# Patient Record
Sex: MALE | Race: WHITE | NOT HISPANIC OR LATINO | Employment: OTHER | URBAN - METROPOLITAN AREA
[De-identification: names, ages, dates, MRNs, and addresses within clinical notes are randomized per-mention and may not be internally consistent; named-entity substitution may affect disease eponyms.]

---

## 2017-09-07 ENCOUNTER — HISTORICAL (OUTPATIENT)
Dept: LAB | Facility: HOSPITAL | Age: 67
End: 2017-09-07

## 2017-09-07 LAB
EST. AVERAGE GLUCOSE BLD GHB EST-MCNC: 126 MG/DL
HBA1C MFR BLD: 6 % (ref 4.5–6.2)
PSA SERPL-MCNC: 0.59 NG/ML (ref 0–4)

## 2018-09-11 ENCOUNTER — HISTORICAL (OUTPATIENT)
Dept: LAB | Facility: HOSPITAL | Age: 68
End: 2018-09-11

## 2018-09-11 LAB
EST. AVERAGE GLUCOSE BLD GHB EST-MCNC: 123 MG/DL
HBA1C MFR BLD: 5.9 % (ref 4.5–6.2)
PSA SERPL-MCNC: 1.44 NG/ML (ref 0–4)

## 2019-05-21 ENCOUNTER — HISTORICAL (OUTPATIENT)
Dept: LAB | Facility: HOSPITAL | Age: 69
End: 2019-05-21

## 2019-05-21 LAB
ALBUMIN SERPL-MCNC: 3.6 GM/DL (ref 3.4–5)
ALBUMIN/GLOB SERPL: 1 RATIO (ref 1.1–2)
ALP SERPL-CCNC: 58 UNIT/L (ref 46–116)
ALT SERPL-CCNC: 31 UNIT/L (ref 12–78)
AST SERPL-CCNC: 14 UNIT/L (ref 10–37)
BILIRUB SERPL-MCNC: 0.3 MG/DL (ref 0.2–1)
BILIRUBIN DIRECT+TOT PNL SERPL-MCNC: 0.13 MG/DL (ref 0–0.2)
BILIRUBIN DIRECT+TOT PNL SERPL-MCNC: 0.17 MG/DL
BUN SERPL-MCNC: 13 MG/DL (ref 7–18)
CALCIUM SERPL-MCNC: 9.1 MG/DL (ref 8.5–10.1)
CHLORIDE SERPL-SCNC: 102 MMOL/L (ref 98–107)
CHOLEST SERPL-MCNC: 131 MG/DL (ref 50–200)
CHOLEST/HDLC SERPL: 2 {RATIO} (ref 0–5)
CO2 SERPL-SCNC: 25.7 MMOL/L (ref 21–32)
CREAT SERPL-MCNC: 0.87 MG/DL (ref 0.7–1.3)
EST. AVERAGE GLUCOSE BLD GHB EST-MCNC: 134 MG/DL
GLOBULIN SER-MCNC: 3.6 GM/DL (ref 2.4–3.5)
GLUCOSE SERPL-MCNC: 124 MG/DL (ref 74–106)
HBA1C MFR BLD: 6.3 % (ref 4.5–6.2)
HDLC SERPL-MCNC: 62 MG/DL (ref 35–60)
LDLC SERPL CALC-MCNC: 53 MG/DL (ref 50–140)
POTASSIUM SERPL-SCNC: 4.5 MMOL/L (ref 3.5–5.1)
PROT SERPL-MCNC: 7.2 GM/DL (ref 6.4–8.2)
PSA SERPL-MCNC: 0.93 NG/ML (ref 0–4)
SODIUM SERPL-SCNC: 137 MMOL/L (ref 136–145)
TRIGL SERPL-MCNC: 80 MG/DL (ref 30–150)
VLDLC SERPL CALC-MCNC: 16 MG/DL

## 2019-10-08 ENCOUNTER — HISTORICAL (OUTPATIENT)
Dept: LAB | Facility: HOSPITAL | Age: 69
End: 2019-10-08

## 2019-10-08 LAB
BUN SERPL-MCNC: 12 MG/DL (ref 7–18)
CALCIUM SERPL-MCNC: 9.3 MG/DL (ref 8.5–10.1)
CHLORIDE SERPL-SCNC: 101 MMOL/L (ref 98–107)
CO2 SERPL-SCNC: 27.6 MMOL/L (ref 21–32)
CREAT SERPL-MCNC: 0.99 MG/DL (ref 0.7–1.3)
CREAT/UREA NIT SERPL: 12
EST. AVERAGE GLUCOSE BLD GHB EST-MCNC: 134 MG/DL
GLUCOSE SERPL-MCNC: 142 MG/DL (ref 74–106)
HBA1C MFR BLD: 6.3 % (ref 4.5–6.2)
POTASSIUM SERPL-SCNC: 4.9 MMOL/L (ref 3.5–5.1)
SODIUM SERPL-SCNC: 136 MMOL/L (ref 136–145)

## 2020-01-17 ENCOUNTER — HISTORICAL (OUTPATIENT)
Dept: CARDIOLOGY | Facility: HOSPITAL | Age: 70
End: 2020-01-17

## 2020-07-06 ENCOUNTER — HISTORICAL (OUTPATIENT)
Dept: LAB | Facility: HOSPITAL | Age: 70
End: 2020-07-06

## 2020-07-06 LAB
ABS NEUT (OLG): 2.8 X10(3)/MCL (ref 1.5–6.9)
BASOPHILS # BLD AUTO: 0 X10(3)/MCL (ref 0–0.1)
BASOPHILS NFR BLD AUTO: 1 % (ref 0–1)
BUN SERPL-MCNC: 14 MG/DL (ref 8.4–25.7)
CALCIUM SERPL-MCNC: 9.2 MG/DL (ref 8.8–10)
CHLORIDE SERPL-SCNC: 106 MMOL/L (ref 98–107)
CO2 SERPL-SCNC: 22 MMOL/L (ref 23–31)
CREAT SERPL-MCNC: 0.78 MG/DL (ref 0.73–1.18)
CREAT/UREA NIT SERPL: 18
EOSINOPHIL # BLD AUTO: 0.7 X10(3)/MCL (ref 0–0.6)
EOSINOPHIL NFR BLD AUTO: 10 % (ref 0–5)
ERYTHROCYTE [DISTWIDTH] IN BLOOD BY AUTOMATED COUNT: 13.1 % (ref 11.5–17)
GLUCOSE SERPL-MCNC: 116 MG/DL (ref 82–115)
HCT VFR BLD AUTO: 43.6 % (ref 42–52)
HGB BLD-MCNC: 15.3 GM/DL (ref 14–18)
IMM GRANULOCYTES # BLD AUTO: 0.01 10*3/UL (ref 0–0.02)
IMM GRANULOCYTES NFR BLD AUTO: 0.1 % (ref 0–0.43)
LYMPHOCYTES # BLD AUTO: 2.6 X10(3)/MCL (ref 0.5–4.1)
LYMPHOCYTES NFR BLD AUTO: 37 % (ref 15–40)
MCH RBC QN AUTO: 34 PG (ref 27–34)
MCHC RBC AUTO-ENTMCNC: 35 GM/DL (ref 31–36)
MCV RBC AUTO: 98 FL (ref 80–99)
MONOCYTES # BLD AUTO: 0.9 X10(3)/MCL (ref 0–1.1)
MONOCYTES NFR BLD AUTO: 13 % (ref 4–12)
NEUTROPHILS # BLD AUTO: 2.8 X10(3)/MCL (ref 1.5–6.9)
NEUTROPHILS NFR BLD AUTO: 40 % (ref 43–75)
PLATELET # BLD AUTO: 259 X10(3)/MCL (ref 140–400)
PMV BLD AUTO: 9.4 FL (ref 6.8–10)
POTASSIUM SERPL-SCNC: 4.1 MMOL/L (ref 3.5–5.1)
RBC # BLD AUTO: 4.46 X10(6)/MCL (ref 4.7–6.1)
SODIUM SERPL-SCNC: 137 MMOL/L (ref 136–145)
WBC # SPEC AUTO: 7 X10(3)/MCL (ref 4.5–11.5)

## 2020-08-13 LAB — CRC RECOMMENDATION EXT: NORMAL

## 2021-01-25 ENCOUNTER — HISTORICAL (OUTPATIENT)
Dept: LAB | Facility: HOSPITAL | Age: 71
End: 2021-01-25

## 2021-01-25 LAB
ALBUMIN SERPL-MCNC: 4 GM/DL (ref 3.4–4.8)
ALBUMIN/GLOB SERPL: 1.2 RATIO (ref 1.1–2)
ALP SERPL-CCNC: 56 UNIT/L (ref 40–150)
ALT SERPL-CCNC: 22 UNIT/L (ref 0–55)
AST SERPL-CCNC: 19 UNIT/L (ref 5–34)
BILIRUB SERPL-MCNC: 0.4 MG/DL
BILIRUBIN DIRECT+TOT PNL SERPL-MCNC: 0.2 MG/DL
BILIRUBIN DIRECT+TOT PNL SERPL-MCNC: 0.2 MG/DL (ref 0–0.5)
BUN SERPL-MCNC: 15 MG/DL (ref 8.4–25.7)
CALCIUM SERPL-MCNC: 9.3 MG/DL (ref 8.8–10)
CHLORIDE SERPL-SCNC: 102 MMOL/L (ref 98–107)
CHOLEST SERPL-MCNC: 156 MG/DL
CHOLEST/HDLC SERPL: 3 {RATIO} (ref 0–5)
CO2 SERPL-SCNC: 24 MEQ/L (ref 23–31)
CREAT SERPL-MCNC: 0.76 MG/DL (ref 0.73–1.18)
EST. AVERAGE GLUCOSE BLD GHB EST-MCNC: 128 MG/DL
GLOBULIN SER-MCNC: 3.4 GM/DL (ref 2.4–3.5)
GLUCOSE SERPL-MCNC: 131 MG/DL (ref 82–115)
HBA1C MFR BLD: 6.1 % (ref 4–6)
HDLC SERPL-MCNC: 56 MG/DL (ref 35–60)
LDLC SERPL CALC-MCNC: 80 MG/DL (ref 50–140)
POTASSIUM SERPL-SCNC: 4.5 MMOL/L (ref 3.5–5.1)
PROT SERPL-MCNC: 7.4 GM/DL (ref 5.8–7.6)
PSA SERPL-MCNC: 0.54 NG/ML
SODIUM SERPL-SCNC: 137 MMOL/L (ref 136–145)
TRIGL SERPL-MCNC: 100 MG/DL (ref 34–140)
VLDLC SERPL CALC-MCNC: 20 MG/DL

## 2021-06-02 ENCOUNTER — HISTORICAL (OUTPATIENT)
Dept: RADIOLOGY | Facility: HOSPITAL | Age: 71
End: 2021-06-02

## 2022-04-10 ENCOUNTER — HISTORICAL (OUTPATIENT)
Dept: ADMINISTRATIVE | Facility: HOSPITAL | Age: 72
End: 2022-04-10

## 2022-04-28 VITALS
BODY MASS INDEX: 32.25 KG/M2 | OXYGEN SATURATION: 96 % | HEIGHT: 63 IN | WEIGHT: 182 LBS | DIASTOLIC BLOOD PRESSURE: 86 MMHG | SYSTOLIC BLOOD PRESSURE: 132 MMHG

## 2022-12-01 DIAGNOSIS — J44.9 CHRONIC OBSTRUCTIVE PULMONARY DISEASE, UNSPECIFIED: ICD-10-CM

## 2022-12-01 RX ORDER — ALBUTEROL SULFATE 90 UG/1
AEROSOL, METERED RESPIRATORY (INHALATION)
Qty: 18 G | Refills: 3 | Status: SHIPPED | OUTPATIENT
Start: 2022-12-01 | End: 2022-12-28

## 2023-01-05 ENCOUNTER — OFFICE VISIT (OUTPATIENT)
Dept: FAMILY MEDICINE | Facility: CLINIC | Age: 73
End: 2023-01-05
Payer: MEDICARE

## 2023-01-05 VITALS
BODY MASS INDEX: 28.99 KG/M2 | HEART RATE: 66 BPM | HEIGHT: 66 IN | SYSTOLIC BLOOD PRESSURE: 138 MMHG | RESPIRATION RATE: 18 BRPM | TEMPERATURE: 98 F | DIASTOLIC BLOOD PRESSURE: 87 MMHG | WEIGHT: 180.38 LBS | OXYGEN SATURATION: 96 %

## 2023-01-05 DIAGNOSIS — Z72.0 TOBACCO ABUSE: ICD-10-CM

## 2023-01-05 DIAGNOSIS — L98.9 SKIN LESION: ICD-10-CM

## 2023-01-05 DIAGNOSIS — R73.03 PRE-DIABETES: ICD-10-CM

## 2023-01-05 DIAGNOSIS — I25.10 CORONARY ARTERY DISEASE, UNSPECIFIED VESSEL OR LESION TYPE, UNSPECIFIED WHETHER ANGINA PRESENT, UNSPECIFIED WHETHER NATIVE OR TRANSPLANTED HEART: ICD-10-CM

## 2023-01-05 DIAGNOSIS — Z12.5 ENCOUNTER FOR PROSTATE CANCER SCREENING: ICD-10-CM

## 2023-01-05 DIAGNOSIS — Z00.00 WELLNESS EXAMINATION: ICD-10-CM

## 2023-01-05 DIAGNOSIS — J44.9 CHRONIC OBSTRUCTIVE PULMONARY DISEASE, UNSPECIFIED COPD TYPE: ICD-10-CM

## 2023-01-05 DIAGNOSIS — E78.5 HYPERLIPIDEMIA, UNSPECIFIED HYPERLIPIDEMIA TYPE: ICD-10-CM

## 2023-01-05 DIAGNOSIS — I10 HYPERTENSION, UNSPECIFIED TYPE: ICD-10-CM

## 2023-01-05 DIAGNOSIS — Z79.51 LONG TERM (CURRENT) USE OF INHALED STEROIDS: ICD-10-CM

## 2023-01-05 DIAGNOSIS — Z76.89 ESTABLISHING CARE WITH NEW DOCTOR, ENCOUNTER FOR: Primary | ICD-10-CM

## 2023-01-05 PROCEDURE — 3075F PR MOST RECENT SYSTOLIC BLOOD PRESS GE 130-139MM HG: ICD-10-PCS | Mod: CPTII,,, | Performed by: REGISTERED NURSE

## 2023-01-05 PROCEDURE — 1159F PR MEDICATION LIST DOCUMENTED IN MEDICAL RECORD: ICD-10-PCS | Mod: CPTII,,, | Performed by: REGISTERED NURSE

## 2023-01-05 PROCEDURE — 99204 OFFICE O/P NEW MOD 45 MIN: CPT | Mod: ,,, | Performed by: REGISTERED NURSE

## 2023-01-05 PROCEDURE — 1126F PR PAIN SEVERITY QUANTIFIED, NO PAIN PRESENT: ICD-10-PCS | Mod: CPTII,,, | Performed by: REGISTERED NURSE

## 2023-01-05 PROCEDURE — 3079F PR MOST RECENT DIASTOLIC BLOOD PRESSURE 80-89 MM HG: ICD-10-PCS | Mod: CPTII,,, | Performed by: REGISTERED NURSE

## 2023-01-05 PROCEDURE — 3008F PR BODY MASS INDEX (BMI) DOCUMENTED: ICD-10-PCS | Mod: CPTII,,, | Performed by: REGISTERED NURSE

## 2023-01-05 PROCEDURE — 1126F AMNT PAIN NOTED NONE PRSNT: CPT | Mod: CPTII,,, | Performed by: REGISTERED NURSE

## 2023-01-05 PROCEDURE — 3075F SYST BP GE 130 - 139MM HG: CPT | Mod: CPTII,,, | Performed by: REGISTERED NURSE

## 2023-01-05 PROCEDURE — 3079F DIAST BP 80-89 MM HG: CPT | Mod: CPTII,,, | Performed by: REGISTERED NURSE

## 2023-01-05 PROCEDURE — 1159F MED LIST DOCD IN RCRD: CPT | Mod: CPTII,,, | Performed by: REGISTERED NURSE

## 2023-01-05 PROCEDURE — 99204 PR OFFICE/OUTPT VISIT, NEW, LEVL IV, 45-59 MIN: ICD-10-PCS | Mod: ,,, | Performed by: REGISTERED NURSE

## 2023-01-05 PROCEDURE — 3008F BODY MASS INDEX DOCD: CPT | Mod: CPTII,,, | Performed by: REGISTERED NURSE

## 2023-01-05 NOTE — PROGRESS NOTES
Subjective:       Patient ID: Siobhan Scott Jr. is a 72 y.o. male.    Chief Complaint: [facility]    Patient is a 72-year-old male here today to [facility] patient has past medical history of hypertension, hyperlipidemia, COPD, CAD with stents, and tobacco abuse.  No complaints at this time.  Review of Systems   Constitutional: Negative.    Respiratory:  Positive for cough and wheezing.    All other systems reviewed and are negative.      Objective:      Physical Exam  Vitals reviewed.   Constitutional:       Appearance: Normal appearance.   HENT:      Head: Normocephalic.      Right Ear: Hearing and tympanic membrane normal.      Left Ear: Hearing and tympanic membrane normal.      Nose: Nose normal.      Mouth/Throat:      Mouth: Mucous membranes are moist.   Eyes:      Pupils: Pupils are equal, round, and reactive to light.   Cardiovascular:      Rate and Rhythm: Normal rate and regular rhythm.      Pulses: Normal pulses.      Heart sounds: Normal heart sounds.   Pulmonary:      Effort: Pulmonary effort is normal.      Breath sounds: Examination of the right-lower field reveals wheezing. Examination of the left-lower field reveals wheezing. Wheezing present.   Abdominal:      General: Abdomen is flat.      Palpations: Abdomen is soft.   Musculoskeletal:         General: Normal range of motion.      Cervical back: Normal range of motion and neck supple.   Skin:     General: Skin is warm.      Capillary Refill: Capillary refill takes less than 2 seconds.      Findings: Lesion present.      Comments: Round plaque like, hyperpigmented lesion noted to R ear   Neurological:      General: No focal deficit present.      Mental Status: He is alert and oriented to person, place, and time.   Psychiatric:         Mood and Affect: Mood normal.         Behavior: Behavior normal.         Thought Content: Thought content normal.         Judgment: Judgment normal.       Assessment:       Problem List Items Addressed This  Visit          Pulmonary    Chronic obstructive pulmonary disease       Cardiac/Vascular    Hypertension    Hyperlipidemia    Coronary artery disease       Other    Tobacco abuse     Other Visit Diagnoses       Establishing care with new doctor, encounter for    -  Primary    Wellness examination        Encounter for prostate cancer screening        Skin lesion            I spent a total of 45 minutes on the day of the visit.This includes face to face time and non-face to face time preparing to see the patient (eg, review of tests), obtaining and/or reviewing separately obtained history, documenting clinical information in the electronic or other health record, independently interpreting results and communicating results to the patient/family/caregiver, or care coordinator.    Plan:   [id number] sat on room air was 96%. Patient encouraged to stop smoking, take inhalers as prescribed, keep next scheduled appointment with pulmonologist.  Report to ED if you become short of breath.    Tobacco abuse-smoking cessation discussed with patient, patient aware of the detrimental effects to health, declines smoking cessation measures at this time.    Hypertension-low-sodium diet discussed, blood pressure currently controlled on medication regimen, continue    Hyperlipidemia-CAD-keep next scheduled appointment with cardiologist on January 20th, stop smoking, low-cholesterol/low-fat diet discussed.  Continue current medication regimen.    Skin lesion-patient encouraged to use sunscreen daily, dermatology referral sent today.    Return to clinic in 3 months for wellness, labs to be completed prior to visit.

## 2023-03-21 ENCOUNTER — LAB VISIT (OUTPATIENT)
Dept: LAB | Facility: HOSPITAL | Age: 73
End: 2023-03-21
Attending: REGISTERED NURSE
Payer: MEDICARE

## 2023-03-21 ENCOUNTER — OFFICE VISIT (OUTPATIENT)
Dept: FAMILY MEDICINE | Facility: CLINIC | Age: 73
End: 2023-03-21
Payer: MEDICARE

## 2023-03-21 VITALS
TEMPERATURE: 98 F | HEART RATE: 76 BPM | HEIGHT: 66 IN | DIASTOLIC BLOOD PRESSURE: 70 MMHG | RESPIRATION RATE: 20 BRPM | SYSTOLIC BLOOD PRESSURE: 112 MMHG | OXYGEN SATURATION: 98 % | WEIGHT: 181.19 LBS | BODY MASS INDEX: 29.12 KG/M2

## 2023-03-21 DIAGNOSIS — E78.5 HYPERLIPIDEMIA, UNSPECIFIED HYPERLIPIDEMIA TYPE: ICD-10-CM

## 2023-03-21 DIAGNOSIS — Z00.00 WELLNESS EXAMINATION: ICD-10-CM

## 2023-03-21 DIAGNOSIS — R73.03 PRE-DIABETES: ICD-10-CM

## 2023-03-21 DIAGNOSIS — Z79.51 LONG TERM (CURRENT) USE OF INHALED STEROIDS: ICD-10-CM

## 2023-03-21 DIAGNOSIS — Z12.5 ENCOUNTER FOR PROSTATE CANCER SCREENING: ICD-10-CM

## 2023-03-21 DIAGNOSIS — I10 HYPERTENSION, UNSPECIFIED TYPE: ICD-10-CM

## 2023-03-21 DIAGNOSIS — R73.03 PREDIABETES: ICD-10-CM

## 2023-03-21 DIAGNOSIS — I25.10 CORONARY ARTERY DISEASE, UNSPECIFIED VESSEL OR LESION TYPE, UNSPECIFIED WHETHER ANGINA PRESENT, UNSPECIFIED WHETHER NATIVE OR TRANSPLANTED HEART: ICD-10-CM

## 2023-03-21 DIAGNOSIS — Z79.899 OTHER LONG TERM (CURRENT) DRUG THERAPY: ICD-10-CM

## 2023-03-21 DIAGNOSIS — J44.9 CHRONIC OBSTRUCTIVE PULMONARY DISEASE, UNSPECIFIED COPD TYPE: ICD-10-CM

## 2023-03-21 DIAGNOSIS — Z00.00 WELLNESS EXAMINATION: Primary | ICD-10-CM

## 2023-03-21 LAB
ALBUMIN SERPL-MCNC: 3.8 G/DL (ref 3.4–4.8)
ALBUMIN/GLOB SERPL: 1.2 RATIO (ref 1.1–2)
ALP SERPL-CCNC: 60 UNIT/L (ref 40–150)
ALT SERPL-CCNC: 22 UNIT/L (ref 0–55)
AST SERPL-CCNC: 16 UNIT/L (ref 5–34)
BASOPHILS # BLD AUTO: 0.04 X10(3)/MCL (ref 0–0.2)
BASOPHILS NFR BLD AUTO: 0.5 %
BILIRUBIN DIRECT+TOT PNL SERPL-MCNC: 0.5 MG/DL
BUN SERPL-MCNC: 12 MG/DL (ref 8.4–25.7)
CALCIUM SERPL-MCNC: 9.2 MG/DL (ref 8.8–10)
CHLORIDE SERPL-SCNC: 103 MMOL/L (ref 98–107)
CHOLEST SERPL-MCNC: 137 MG/DL
CHOLEST/HDLC SERPL: 3 {RATIO} (ref 0–5)
CO2 SERPL-SCNC: 26 MMOL/L (ref 23–31)
CREAT SERPL-MCNC: 0.76 MG/DL (ref 0.73–1.18)
DEPRECATED CALCIDIOL+CALCIFEROL SERPL-MC: 43.8 NG/ML (ref 30–80)
EOSINOPHIL # BLD AUTO: 0.75 X10(3)/MCL (ref 0–0.9)
EOSINOPHIL NFR BLD AUTO: 9.2 %
ERYTHROCYTE [DISTWIDTH] IN BLOOD BY AUTOMATED COUNT: 12.8 % (ref 11.5–17)
EST. AVERAGE GLUCOSE BLD GHB EST-MCNC: 137 MG/DL
GFR SERPLBLD CREATININE-BSD FMLA CKD-EPI: >60 MLS/MIN/1.73/M2
GLOBULIN SER-MCNC: 3.2 GM/DL (ref 2.4–3.5)
GLUCOSE SERPL-MCNC: 157 MG/DL (ref 82–115)
HBA1C MFR BLD: 6.4 %
HCT VFR BLD AUTO: 47.8 % (ref 42–52)
HCV AB SERPL QL IA: NONREACTIVE
HDLC SERPL-MCNC: 48 MG/DL (ref 35–60)
HGB BLD-MCNC: 16.1 G/DL (ref 14–18)
HIV 1+2 AB+HIV1 P24 AG SERPL QL IA: NONREACTIVE
IMM GRANULOCYTES # BLD AUTO: 0.02 X10(3)/MCL (ref 0–0.04)
IMM GRANULOCYTES NFR BLD AUTO: 0.2 %
LDLC SERPL CALC-MCNC: 70 MG/DL (ref 50–140)
LYMPHOCYTES # BLD AUTO: 3.05 X10(3)/MCL (ref 0.6–4.6)
LYMPHOCYTES NFR BLD AUTO: 37.6 %
MCH RBC QN AUTO: 32.7 PG
MCHC RBC AUTO-ENTMCNC: 33.7 G/DL (ref 33–36)
MCV RBC AUTO: 97.2 FL (ref 80–94)
MONOCYTES # BLD AUTO: 0.72 X10(3)/MCL (ref 0.1–1.3)
MONOCYTES NFR BLD AUTO: 8.9 %
NEUTROPHILS # BLD AUTO: 3.53 X10(3)/MCL (ref 2.1–9.2)
NEUTROPHILS NFR BLD AUTO: 43.6 %
NRBC BLD AUTO-RTO: 0 %
PLATELET # BLD AUTO: 236 X10(3)/MCL (ref 130–400)
PMV BLD AUTO: 9.9 FL (ref 7.4–10.4)
POTASSIUM SERPL-SCNC: 4.2 MMOL/L (ref 3.5–5.1)
PROT SERPL-MCNC: 7 GM/DL (ref 5.8–7.6)
PSA SERPL-MCNC: 0.53 NG/ML
RBC # BLD AUTO: 4.92 X10(6)/MCL (ref 4.7–6.1)
SODIUM SERPL-SCNC: 138 MMOL/L (ref 136–145)
TRIGL SERPL-MCNC: 95 MG/DL (ref 34–140)
TSH SERPL-ACNC: 1.43 UIU/ML (ref 0.35–4.94)
VLDLC SERPL CALC-MCNC: 19 MG/DL
WBC # SPEC AUTO: 8.1 X10(3)/MCL (ref 4.5–11.5)

## 2023-03-21 PROCEDURE — 84443 ASSAY THYROID STIM HORMONE: CPT

## 2023-03-21 PROCEDURE — 1101F PR PT FALLS ASSESS DOC 0-1 FALLS W/OUT INJ PAST YR: ICD-10-PCS | Mod: CPTII,,, | Performed by: REGISTERED NURSE

## 2023-03-21 PROCEDURE — 3074F SYST BP LT 130 MM HG: CPT | Mod: CPTII,,, | Performed by: REGISTERED NURSE

## 2023-03-21 PROCEDURE — 4010F ACE/ARB THERAPY RXD/TAKEN: CPT | Mod: CPTII,,, | Performed by: REGISTERED NURSE

## 2023-03-21 PROCEDURE — 4010F PR ACE/ARB THEARPY RXD/TAKEN: ICD-10-PCS | Mod: CPTII,,, | Performed by: REGISTERED NURSE

## 2023-03-21 PROCEDURE — 36415 COLL VENOUS BLD VENIPUNCTURE: CPT

## 2023-03-21 PROCEDURE — 1159F MED LIST DOCD IN RCRD: CPT | Mod: CPTII,,, | Performed by: REGISTERED NURSE

## 2023-03-21 PROCEDURE — 1126F AMNT PAIN NOTED NONE PRSNT: CPT | Mod: CPTII,,, | Performed by: REGISTERED NURSE

## 2023-03-21 PROCEDURE — 3008F PR BODY MASS INDEX (BMI) DOCUMENTED: ICD-10-PCS | Mod: CPTII,,, | Performed by: REGISTERED NURSE

## 2023-03-21 PROCEDURE — 80053 COMPREHEN METABOLIC PANEL: CPT

## 2023-03-21 PROCEDURE — 99215 PR OFFICE/OUTPT VISIT, EST, LEVL V, 40-54 MIN: ICD-10-PCS | Mod: ,,, | Performed by: REGISTERED NURSE

## 2023-03-21 PROCEDURE — 80061 LIPID PANEL: CPT

## 2023-03-21 PROCEDURE — 1159F PR MEDICATION LIST DOCUMENTED IN MEDICAL RECORD: ICD-10-PCS | Mod: CPTII,,, | Performed by: REGISTERED NURSE

## 2023-03-21 PROCEDURE — 84153 ASSAY OF PSA TOTAL: CPT

## 2023-03-21 PROCEDURE — 3288F PR FALLS RISK ASSESSMENT DOCUMENTED: ICD-10-PCS | Mod: CPTII,,, | Performed by: REGISTERED NURSE

## 2023-03-21 PROCEDURE — 82306 VITAMIN D 25 HYDROXY: CPT

## 2023-03-21 PROCEDURE — 3078F PR MOST RECENT DIASTOLIC BLOOD PRESSURE < 80 MM HG: ICD-10-PCS | Mod: CPTII,,, | Performed by: REGISTERED NURSE

## 2023-03-21 PROCEDURE — 3074F PR MOST RECENT SYSTOLIC BLOOD PRESSURE < 130 MM HG: ICD-10-PCS | Mod: CPTII,,, | Performed by: REGISTERED NURSE

## 2023-03-21 PROCEDURE — 86803 HEPATITIS C AB TEST: CPT

## 2023-03-21 PROCEDURE — 1101F PT FALLS ASSESS-DOCD LE1/YR: CPT | Mod: CPTII,,, | Performed by: REGISTERED NURSE

## 2023-03-21 PROCEDURE — 1126F PR PAIN SEVERITY QUANTIFIED, NO PAIN PRESENT: ICD-10-PCS | Mod: CPTII,,, | Performed by: REGISTERED NURSE

## 2023-03-21 PROCEDURE — 3008F BODY MASS INDEX DOCD: CPT | Mod: CPTII,,, | Performed by: REGISTERED NURSE

## 2023-03-21 PROCEDURE — 3288F FALL RISK ASSESSMENT DOCD: CPT | Mod: CPTII,,, | Performed by: REGISTERED NURSE

## 2023-03-21 PROCEDURE — 85025 COMPLETE CBC W/AUTO DIFF WBC: CPT

## 2023-03-21 PROCEDURE — 99215 OFFICE O/P EST HI 40 MIN: CPT | Mod: ,,, | Performed by: REGISTERED NURSE

## 2023-03-21 PROCEDURE — 3078F DIAST BP <80 MM HG: CPT | Mod: CPTII,,, | Performed by: REGISTERED NURSE

## 2023-03-21 PROCEDURE — 87389 HIV-1 AG W/HIV-1&-2 AB AG IA: CPT

## 2023-03-21 PROCEDURE — 83036 HEMOGLOBIN GLYCOSYLATED A1C: CPT

## 2023-03-21 NOTE — PROGRESS NOTES
Subjective:       Patient ID: Siobhan Scott Jr. is a 73 y.o. male.    Chief Complaint: Medicare AWV    Patient is a 73-year-old male here today for wellness exam.  Patient has past medical history tobacco abuse, COPD, hypertension, hyperlipidemia, CAD, and prediabetes.  No complaints at this time.  Labs reviewed with patient today.  Colonoscopy due in 1 year, patient will follow-up with Dr. Sterling.  Review of Systems   Constitutional: Negative.  Negative for chills, fatigue and fever.   All other systems reviewed and are negative.      Objective:      Physical Exam  Vitals reviewed.   Constitutional:       Appearance: Normal appearance.   HENT:      Head: Normocephalic and atraumatic.      Right Ear: Tympanic membrane normal.      Left Ear: Tympanic membrane normal.      Nose: Nose normal.      Mouth/Throat:      Mouth: Mucous membranes are moist.   Eyes:      Pupils: Pupils are equal, round, and reactive to light.   Cardiovascular:      Rate and Rhythm: Normal rate and regular rhythm.      Pulses: Normal pulses.      Heart sounds: Normal heart sounds.   Pulmonary:      Effort: Pulmonary effort is normal.      Breath sounds: Normal breath sounds.   Abdominal:      General: Abdomen is flat.      Palpations: Abdomen is soft.   Musculoskeletal:         General: Normal range of motion.      Cervical back: Normal range of motion and neck supple.   Skin:     General: Skin is warm.      Capillary Refill: Capillary refill takes less than 2 seconds.      Findings: Lesion present. No rash.      Comments: Lesion to R ear, erythremic plaque to tip of R ear    Neurological:      General: No focal deficit present.      Mental Status: He is alert and oriented to person, place, and time.   Psychiatric:         Mood and Affect: Mood normal.         Behavior: Behavior normal.         Thought Content: Thought content normal.         Judgment: Judgment normal.       Assessment:       Problem List Items Addressed This Visit           Pulmonary    Chronic obstructive pulmonary disease       Cardiac/Vascular    Hypertension    Hyperlipidemia    Coronary artery disease       Endocrine    Prediabetes       Other    Wellness examination - Primary   I spent a total of 45 minutes on the day of the visit.This includes face to face time and non-face to face time preparing to see the patient (eg, review of tests), obtaining and/or reviewing separately obtained history, documenting clinical information in the electronic or other health record, independently interpreting results and communicating results to the patient/family/caregiver, or care coordinator.       Plan:   Wellness- labs reviewed with patient, smoking cessation discussed, follow-up with Dr. Sterling to schedule next colonoscopy.    Hypertension-BP currently controlled on current medication regimen, continue.  Low-sodium diet discussed.    Hyperlipidemia-low-cholesterol/low-fat diet discussed, continue current medication regimen.  Stop smoking. Increase intake of fiber, vegetables, fruits, and other whole grains. Increase physical activity to at least 30 minutes most days as tolerated    COPD-smoking cessation discussed, keep next scheduled appointment with pulmonologist.  Continue current medication regimen.    Pre-diabetes-Stop smoking, increase physical activity to 30min most days as tolerated, lose weight, try a mediterranean diet, which are typically high in fruits, vegetables, whole grains, beans, nuts, and seeds. Decrease carbs.     Return to clinic in 6 months for wellness, labs to be completed prior to visit.

## 2023-03-21 NOTE — LETTER
AUTHORIZATION FOR RELEASE OF   CONFIDENTIAL INFORMATION    Dear Dr Sterling,    We are seeing Siobhan Scott Jr., date of birth 1950, in the clinic at [facility]. Otis Estrada DO is the patient's PCP. Siobhan Scott Jr. has an outstanding lab/procedure at the time we reviewed his chart. In order to help keep his health information updated, he has authorized us to request the following medical record(s):        (  )  MAMMOGRAM                                      ( x )  COLONOSCOPY      (  )  PAP SMEAR                                          (  )  OUTSIDE LAB RESULTS     (  )  DEXA SCAN                                          (  )  EYE EXAM            (  )  FOOT EXAM                                          (  )  ENTIRE RECORD     (  )  OUTSIDE IMMUNIZATIONS                 (  )  _______________         Please fax records to [facility], Quinn Quebodeaux, DO, [phone]          Patient Name: Siobhan Scott Jr.  : 1950  Patient Phone #: [phone]

## 2023-03-23 ENCOUNTER — DOCUMENTATION ONLY (OUTPATIENT)
Dept: FAMILY MEDICINE | Facility: CLINIC | Age: 73
End: 2023-03-23
Payer: MEDICARE

## 2023-06-21 DIAGNOSIS — J44.9 CHRONIC OBSTRUCTIVE PULMONARY DISEASE, UNSPECIFIED: ICD-10-CM

## 2023-06-21 RX ORDER — ALBUTEROL SULFATE 90 UG/1
AEROSOL, METERED RESPIRATORY (INHALATION)
Qty: 18 G | Refills: 11 | Status: SHIPPED | OUTPATIENT
Start: 2023-06-21 | End: 2024-01-30 | Stop reason: SDUPTHER

## 2023-06-26 PROBLEM — Z00.00 WELLNESS EXAMINATION: Status: RESOLVED | Noted: 2023-03-21 | Resolved: 2023-06-26

## 2023-08-16 ENCOUNTER — LAB VISIT (OUTPATIENT)
Dept: LAB | Facility: HOSPITAL | Age: 73
End: 2023-08-16
Attending: INTERNAL MEDICINE
Payer: MEDICARE

## 2023-08-16 DIAGNOSIS — I10 ESSENTIAL HYPERTENSION, MALIGNANT: ICD-10-CM

## 2023-08-16 DIAGNOSIS — I25.10 CORONARY ATHEROSCLEROSIS OF NATIVE CORONARY ARTERY: Primary | ICD-10-CM

## 2023-08-16 LAB
ANION GAP SERPL CALC-SCNC: 9 MEQ/L (ref 2–13)
BASOPHILS # BLD AUTO: 0.04 X10(3)/MCL (ref 0.01–0.08)
BASOPHILS NFR BLD AUTO: 0.5 % (ref 0.1–1.2)
BUN SERPL-MCNC: 15 MG/DL (ref 7–20)
CALCIUM SERPL-MCNC: 9.5 MG/DL (ref 8.4–10.2)
CHLORIDE SERPL-SCNC: 104 MMOL/L (ref 98–110)
CO2 SERPL-SCNC: 24 MMOL/L (ref 21–32)
CREAT SERPL-MCNC: 0.75 MG/DL (ref 0.66–1.25)
CREAT/UREA NIT SERPL: 20 (ref 12–20)
EOSINOPHIL # BLD AUTO: 0.63 X10(3)/MCL (ref 0.04–0.54)
EOSINOPHIL NFR BLD AUTO: 7.5 % (ref 0.7–7)
ERYTHROCYTE [DISTWIDTH] IN BLOOD BY AUTOMATED COUNT: 12.8 %
GFR SERPLBLD CREATININE-BSD FMLA CKD-EPI: >90 MLS/MIN/1.73/M2
GLUCOSE SERPL-MCNC: 152 MG/DL (ref 70–115)
HCT VFR BLD AUTO: 49 % (ref 36–52)
HGB BLD-MCNC: 17.2 G/DL (ref 13–18)
IMM GRANULOCYTES # BLD AUTO: 0.04 X10(3)/MCL (ref 0–0.03)
IMM GRANULOCYTES NFR BLD AUTO: 0.5 % (ref 0–0.5)
LYMPHOCYTES # BLD AUTO: 2.7 X10(3)/MCL (ref 1.32–3.57)
LYMPHOCYTES NFR BLD AUTO: 32.3 % (ref 20–55)
MCH RBC QN AUTO: 33.4 PG (ref 27–34)
MCHC RBC AUTO-ENTMCNC: 35.1 G/DL (ref 31–37)
MCV RBC AUTO: 95.1 FL (ref 79–99)
MONOCYTES # BLD AUTO: 0.82 X10(3)/MCL (ref 0.3–0.82)
MONOCYTES NFR BLD AUTO: 9.8 % (ref 4.7–12.5)
NEUTROPHILS # BLD AUTO: 4.12 X10(3)/MCL (ref 1.78–5.38)
NEUTROPHILS NFR BLD AUTO: 49.4 % (ref 37–73)
NRBC BLD AUTO-RTO: 0 %
PLATELET # BLD AUTO: 251 X10(3)/MCL (ref 140–371)
PMV BLD AUTO: 9.7 FL (ref 9.4–12.4)
POTASSIUM SERPL-SCNC: 4.8 MMOL/L (ref 3.5–5.1)
RBC # BLD AUTO: 5.15 X10(6)/MCL (ref 4–6)
SODIUM SERPL-SCNC: 137 MMOL/L (ref 135–145)
WBC # SPEC AUTO: 8.35 X10(3)/MCL (ref 4–11.5)

## 2023-08-16 PROCEDURE — 85025 COMPLETE CBC W/AUTO DIFF WBC: CPT

## 2023-08-16 PROCEDURE — 80048 BASIC METABOLIC PNL TOTAL CA: CPT

## 2023-08-16 PROCEDURE — 36415 COLL VENOUS BLD VENIPUNCTURE: CPT

## 2023-08-31 RX ORDER — ASPIRIN 81 MG/1
81 TABLET ORAL DAILY
COMMUNITY
End: 2024-03-27 | Stop reason: SDUPTHER

## 2023-08-31 RX ORDER — NITROGLYCERIN 0.4 MG/1
0.4 TABLET SUBLINGUAL EVERY 5 MIN PRN
COMMUNITY
End: 2024-03-27 | Stop reason: SDUPTHER

## 2023-09-05 ENCOUNTER — HOSPITAL ENCOUNTER (OUTPATIENT)
Facility: HOSPITAL | Age: 73
Discharge: HOME | End: 2023-09-06
Attending: INTERNAL MEDICINE | Admitting: FAMILY MEDICINE
Payer: MEDICARE

## 2023-09-05 DIAGNOSIS — I25.119 CHEST PAIN DUE TO CAD: ICD-10-CM

## 2023-09-05 DIAGNOSIS — I25.10 CORONARY ARTERY DISEASE, UNSPECIFIED VESSEL OR LESION TYPE, UNSPECIFIED WHETHER ANGINA PRESENT, UNSPECIFIED WHETHER NATIVE OR TRANSPLANTED HEART: Primary | ICD-10-CM

## 2023-09-05 DIAGNOSIS — R94.39 ABNORMAL NUCLEAR STRESS TEST: ICD-10-CM

## 2023-09-05 DIAGNOSIS — Z01.818 PREOP EXAMINATION: ICD-10-CM

## 2023-09-05 DIAGNOSIS — Z01.818 PRE-OP EVALUATION: ICD-10-CM

## 2023-09-05 LAB — POCT GLUCOSE: 128 MG/DL (ref 70–110)

## 2023-09-05 PROCEDURE — 0715T *HC CORONARY LITHOTRIPSY: CPT | Performed by: INTERNAL MEDICINE

## 2023-09-05 PROCEDURE — G0378 HOSPITAL OBSERVATION PER HR: HCPCS

## 2023-09-05 PROCEDURE — 25000003 PHARM REV CODE 250: Performed by: INTERNAL MEDICINE

## 2023-09-05 PROCEDURE — C1753 CATH, INTRAVAS ULTRASOUND: HCPCS | Performed by: INTERNAL MEDICINE

## 2023-09-05 PROCEDURE — 99152 MOD SED SAME PHYS/QHP 5/>YRS: CPT | Performed by: INTERNAL MEDICINE

## 2023-09-05 PROCEDURE — C1887 CATHETER, GUIDING: HCPCS | Performed by: INTERNAL MEDICINE

## 2023-09-05 PROCEDURE — 96361 HYDRATE IV INFUSION ADD-ON: CPT

## 2023-09-05 PROCEDURE — 92978 ENDOLUMINL IVUS OCT C 1ST: CPT | Mod: LD | Performed by: INTERNAL MEDICINE

## 2023-09-05 PROCEDURE — 25000003 PHARM REV CODE 250

## 2023-09-05 PROCEDURE — C9600 PERC DRUG-EL COR STENT SING: HCPCS | Mod: LD | Performed by: INTERNAL MEDICINE

## 2023-09-05 PROCEDURE — C1725 CATH, TRANSLUMIN NON-LASER: HCPCS | Performed by: INTERNAL MEDICINE

## 2023-09-05 PROCEDURE — 96360 HYDRATION IV INFUSION INIT: CPT

## 2023-09-05 PROCEDURE — 27000221 HC OXYGEN, UP TO 24 HOURS

## 2023-09-05 PROCEDURE — 94761 N-INVAS EAR/PLS OXIMETRY MLT: CPT

## 2023-09-05 PROCEDURE — C1874 STENT, COATED/COV W/DEL SYS: HCPCS | Performed by: INTERNAL MEDICINE

## 2023-09-05 PROCEDURE — 27201423 OPTIME MED/SURG SUP & DEVICES STERILE SUPPLY: Performed by: INTERNAL MEDICINE

## 2023-09-05 PROCEDURE — 93458 L HRT ARTERY/VENTRICLE ANGIO: CPT | Mod: 59 | Performed by: INTERNAL MEDICINE

## 2023-09-05 PROCEDURE — C1769 GUIDE WIRE: HCPCS | Performed by: INTERNAL MEDICINE

## 2023-09-05 PROCEDURE — 63600175 PHARM REV CODE 636 W HCPCS: Performed by: INTERNAL MEDICINE

## 2023-09-05 PROCEDURE — 25500020 PHARM REV CODE 255: Performed by: INTERNAL MEDICINE

## 2023-09-05 PROCEDURE — 85347 COAGULATION TIME ACTIVATED: CPT | Performed by: INTERNAL MEDICINE

## 2023-09-05 PROCEDURE — 99153 MOD SED SAME PHYS/QHP EA: CPT | Performed by: INTERNAL MEDICINE

## 2023-09-05 PROCEDURE — 93799 UNLISTED CV SVC/PROCEDURE: CPT | Performed by: INTERNAL MEDICINE

## 2023-09-05 PROCEDURE — C1894 INTRO/SHEATH, NON-LASER: HCPCS | Performed by: INTERNAL MEDICINE

## 2023-09-05 PROCEDURE — 25000242 PHARM REV CODE 250 ALT 637 W/ HCPCS: Performed by: INTERNAL MEDICINE

## 2023-09-05 PROCEDURE — 99900035 HC TECH TIME PER 15 MIN (STAT)

## 2023-09-05 PROCEDURE — C1761 OPTIME CATH, TRANSLUMINAL INTRAVASC LITHO, CORONARY: HCPCS | Performed by: INTERNAL MEDICINE

## 2023-09-05 DEVICE — EVEROLIMUS-ELUTING PLATINUM CHROMIUM CORONARY STENT SYSTEM
Type: IMPLANTABLE DEVICE | Site: HEART | Status: FUNCTIONAL
Brand: SYNERGY™ XD

## 2023-09-05 RX ORDER — CLOPIDOGREL BISULFATE 75 MG/1
75 TABLET ORAL DAILY
Status: DISCONTINUED | OUTPATIENT
Start: 2023-09-06 | End: 2023-09-06 | Stop reason: HOSPADM

## 2023-09-05 RX ORDER — RANOLAZINE 500 MG/1
1000 TABLET, EXTENDED RELEASE ORAL 2 TIMES DAILY
Status: DISCONTINUED | OUTPATIENT
Start: 2023-09-05 | End: 2023-09-06 | Stop reason: HOSPADM

## 2023-09-05 RX ORDER — FENTANYL CITRATE 50 UG/ML
25 INJECTION, SOLUTION INTRAMUSCULAR; INTRAVENOUS ONCE
Status: COMPLETED | OUTPATIENT
Start: 2023-09-05 | End: 2023-09-05

## 2023-09-05 RX ORDER — NITROGLYCERIN 0.4 MG/1
0.4 TABLET SUBLINGUAL ONCE
Status: COMPLETED | OUTPATIENT
Start: 2023-09-05 | End: 2023-09-05

## 2023-09-05 RX ORDER — EZETIMIBE 10 MG/1
10 TABLET ORAL DAILY
Status: DISCONTINUED | OUTPATIENT
Start: 2023-09-06 | End: 2023-09-06 | Stop reason: HOSPADM

## 2023-09-05 RX ORDER — SODIUM CHLORIDE 0.9 % (FLUSH) 0.9 %
10 SYRINGE (ML) INJECTION
Status: DISCONTINUED | OUTPATIENT
Start: 2023-09-05 | End: 2023-09-05

## 2023-09-05 RX ORDER — CLOPIDOGREL BISULFATE 75 MG/1
TABLET ORAL
Status: DISCONTINUED | OUTPATIENT
Start: 2023-09-05 | End: 2023-09-05 | Stop reason: HOSPADM

## 2023-09-05 RX ORDER — ASPIRIN 325 MG
325 TABLET ORAL
Status: DISCONTINUED | OUTPATIENT
Start: 2023-09-05 | End: 2023-09-05

## 2023-09-05 RX ORDER — DIAZEPAM 5 MG/1
10 TABLET ORAL
Status: DISCONTINUED | OUTPATIENT
Start: 2023-09-05 | End: 2023-09-05

## 2023-09-05 RX ORDER — NITROGLYCERIN 20 MG/100ML
INJECTION INTRAVENOUS
Status: DISCONTINUED | OUTPATIENT
Start: 2023-09-05 | End: 2023-09-05

## 2023-09-05 RX ORDER — SODIUM CHLORIDE 9 MG/ML
INJECTION, SOLUTION INTRAVENOUS ONCE
Status: COMPLETED | OUTPATIENT
Start: 2023-09-05 | End: 2023-09-05

## 2023-09-05 RX ORDER — METOPROLOL SUCCINATE 50 MG/1
100 TABLET, EXTENDED RELEASE ORAL DAILY
Status: DISCONTINUED | OUTPATIENT
Start: 2023-09-06 | End: 2023-09-06 | Stop reason: HOSPADM

## 2023-09-05 RX ORDER — DIPHENHYDRAMINE HCL 25 MG
50 CAPSULE ORAL
Status: DISCONTINUED | OUTPATIENT
Start: 2023-09-05 | End: 2023-09-05

## 2023-09-05 RX ORDER — SODIUM CHLORIDE 9 MG/ML
INJECTION, SOLUTION INTRAVENOUS
Status: DISCONTINUED | OUTPATIENT
Start: 2023-09-05 | End: 2023-09-05

## 2023-09-05 RX ORDER — ALBUTEROL SULFATE 90 UG/1
2 AEROSOL, METERED RESPIRATORY (INHALATION) EVERY 4 HOURS PRN
Status: DISCONTINUED | OUTPATIENT
Start: 2023-09-05 | End: 2023-09-05

## 2023-09-05 RX ORDER — AMLODIPINE BESYLATE 5 MG/1
5 TABLET ORAL DAILY
Status: DISCONTINUED | OUTPATIENT
Start: 2023-09-06 | End: 2023-09-06 | Stop reason: HOSPADM

## 2023-09-05 RX ORDER — ALBUTEROL SULFATE 0.83 MG/ML
2.5 SOLUTION RESPIRATORY (INHALATION) EVERY 4 HOURS PRN
Status: DISCONTINUED | OUTPATIENT
Start: 2023-09-05 | End: 2023-09-06 | Stop reason: HOSPADM

## 2023-09-05 RX ORDER — ONDANSETRON 2 MG/ML
INJECTION INTRAMUSCULAR; INTRAVENOUS
Status: DISCONTINUED | OUTPATIENT
Start: 2023-09-05 | End: 2023-09-05 | Stop reason: HOSPADM

## 2023-09-05 RX ORDER — LIDOCAINE HYDROCHLORIDE 20 MG/ML
INJECTION, SOLUTION EPIDURAL; INFILTRATION; INTRACAUDAL; PERINEURAL
Status: DISCONTINUED | OUTPATIENT
Start: 2023-09-05 | End: 2023-09-05 | Stop reason: HOSPADM

## 2023-09-05 RX ORDER — ISOSORBIDE MONONITRATE 60 MG/1
120 TABLET, EXTENDED RELEASE ORAL DAILY
Status: DISCONTINUED | OUTPATIENT
Start: 2023-09-06 | End: 2023-09-06 | Stop reason: HOSPADM

## 2023-09-05 RX ORDER — FENTANYL CITRATE 50 UG/ML
INJECTION, SOLUTION INTRAMUSCULAR; INTRAVENOUS
Status: DISCONTINUED | OUTPATIENT
Start: 2023-09-05 | End: 2023-09-05 | Stop reason: HOSPADM

## 2023-09-05 RX ORDER — NITROGLYCERIN 0.4 MG/1
0.4 TABLET SUBLINGUAL EVERY 5 MIN PRN
Status: DISCONTINUED | OUTPATIENT
Start: 2023-09-05 | End: 2023-09-06 | Stop reason: HOSPADM

## 2023-09-05 RX ORDER — HEPARIN SODIUM 1000 [USP'U]/ML
INJECTION, SOLUTION INTRAVENOUS; SUBCUTANEOUS
Status: DISCONTINUED | OUTPATIENT
Start: 2023-09-05 | End: 2023-09-05 | Stop reason: HOSPADM

## 2023-09-05 RX ORDER — SODIUM CHLORIDE 9 MG/ML
INJECTION, SOLUTION INTRAVENOUS CONTINUOUS
Status: ACTIVE | OUTPATIENT
Start: 2023-09-05 | End: 2023-09-05

## 2023-09-05 RX ORDER — MIDAZOLAM HYDROCHLORIDE 1 MG/ML
INJECTION, SOLUTION INTRAMUSCULAR; INTRAVENOUS
Status: DISCONTINUED | OUTPATIENT
Start: 2023-09-05 | End: 2023-09-05 | Stop reason: HOSPADM

## 2023-09-05 RX ORDER — ASPIRIN 81 MG/1
81 TABLET ORAL DAILY
Status: DISCONTINUED | OUTPATIENT
Start: 2023-09-06 | End: 2023-09-06 | Stop reason: HOSPADM

## 2023-09-05 RX ADMIN — FENTANYL CITRATE 25 MCG: 50 INJECTION, SOLUTION INTRAMUSCULAR; INTRAVENOUS at 11:09

## 2023-09-05 RX ADMIN — NITROGLYCERIN 0.4 MG: 0.4 TABLET SUBLINGUAL at 11:09

## 2023-09-05 RX ADMIN — DIPHENHYDRAMINE HYDROCHLORIDE 50 MG: 25 CAPSULE ORAL at 07:09

## 2023-09-05 RX ADMIN — SODIUM CHLORIDE: 9 INJECTION, SOLUTION INTRAVENOUS at 07:09

## 2023-09-05 RX ADMIN — RANOLAZINE 1000 MG: 500 TABLET, EXTENDED RELEASE ORAL at 08:09

## 2023-09-05 RX ADMIN — DIAZEPAM 10 MG: 5 TABLET ORAL at 07:09

## 2023-09-05 RX ADMIN — ASPIRIN 325 MG ORAL TABLET 325 MG: 325 PILL ORAL at 07:09

## 2023-09-05 NOTE — PLAN OF CARE
Problem: Adult Inpatient Plan of Care  Goal: Plan of Care Review  9/5/2023 1735 by Katrina Patel RN  Outcome: Ongoing, Progressing  Flowsheets (Taken 9/5/2023 1735)  Plan of Care Reviewed With:   patient   spouse  9/5/2023 1734 by Katrina Patel RN  Outcome: Ongoing, Progressing  Goal: Patient-Specific Goal (Individualized)  9/5/2023 1735 by Katrina Patel RN  Outcome: Ongoing, Progressing  9/5/2023 1734 by Katrina Patel RN  Outcome: Ongoing, Progressing  Goal: Absence of Hospital-Acquired Illness or Injury  9/5/2023 1735 by Katrina Patel RN  Outcome: Ongoing, Progressing  9/5/2023 1734 by Katrina Patel RN  Outcome: Ongoing, Progressing  Intervention: Identify and Manage Fall Risk  Flowsheets (Taken 9/5/2023 1735)  Safety Promotion/Fall Prevention:   assistive device/personal item within reach   bed alarm set   nonskid shoes/socks when out of bed   medications reviewed   /camera at bedside   supervised activity   instructed to call staff for mobility  Intervention: Prevent Skin Injury  Flowsheets (Taken 9/5/2023 1735)  Body Position: position changed independently  Skin Protection: adhesive use limited  Intervention: Prevent and Manage VTE (Venous Thromboembolism) Risk  Flowsheets (Taken 9/5/2023 1735)  Activity Management: Ambulated to bathroom - L4  Range of Motion: active ROM (range of motion) encouraged  Intervention: Prevent Infection  Flowsheets (Taken 9/5/2023 1735)  Infection Prevention:   hand hygiene promoted   personal protective equipment utilized   rest/sleep promoted  Goal: Optimal Comfort and Wellbeing  9/5/2023 1735 by Katrina Patel RN  Outcome: Ongoing, Progressing  9/5/2023 1734 by Katrina Patel RN  Outcome: Ongoing, Progressing  Intervention: Provide Person-Centered Care  Flowsheets (Taken 9/5/2023 1735)  Trust Relationship/Rapport:   care explained   choices provided   emotional support provided   empathic listening provided   questions answered   thoughts/feelings acknowledged    reassurance provided   questions encouraged  Goal: Readiness for Transition of Care  9/5/2023 1735 by Katrina Patel, RN  Outcome: Ongoing, Progressing  9/5/2023 1734 by Katrina Patel RN  Outcome: Ongoing, Progressing     Problem: Fall Injury Risk  Goal: Absence of Fall and Fall-Related Injury  Outcome: Ongoing, Progressing  Intervention: Identify and Manage Contributors  Flowsheets (Taken 9/5/2023 1735)  Self-Care Promotion: independence encouraged  Medication Review/Management: medications reviewed  Intervention: Promote Injury-Free Environment  Flowsheets (Taken 9/5/2023 1735)  Safety Promotion/Fall Prevention:   assistive device/personal item within reach   bed alarm set   nonskid shoes/socks when out of bed   medications reviewed   /camera at bedside   supervised activity   instructed to call staff for mobility     Problem: Activity Intolerance  Goal: Enhanced Capacity and Energy  Outcome: Ongoing, Progressing  Intervention: Optimize Activity Tolerance  Flowsheets (Taken 9/5/2023 1735)  Self-Care Promotion: independence encouraged  Activity Management: Ambulated to bathroom - L4  Environmental Support:   calm environment promoted   caregiver consistency promoted

## 2023-09-05 NOTE — Clinical Note
The site was marked. The groin and radials was prepped. The site was prepped with ChloraPrep. The site was clipped. The patient was draped.

## 2023-09-05 NOTE — INTERVAL H&P NOTE
The patient has been examined and the H&P has been reviewed:    I concur with the findings and no changes have occurred since H&P was written.    Procedure risks, benefits and alternative options discussed and understood by patient/family.    There are no hospital problems to display for this patient.

## 2023-09-05 NOTE — Clinical Note
The catheter was repositioned into the ostium   left main. An angiography was performed of the left coronary arteries. Multiple views were taken. The angiography was performed via power injection. The injected amount was 10 mL contrast at 4 mL/s.

## 2023-09-05 NOTE — Clinical Note
The DP pulses were 2+ bilaterally. The PT pulses were 2+ bilaterally. The radial pulses were allens test positive and +2 bilaterally.

## 2023-09-05 NOTE — DISCHARGE INSTRUCTIONS
WRIST ACCESS: No lifting over 5 lbs for 3 days with that arm/hand, wait 24 hrs before driving, avoid flexing at the wrist such as hammering, playing tennis, or swinging objects.    GROIN ACCESS: No driving for 3 days, no heavy lifting (over 10 lbs) for 3 days, wait 3 days before sexual intercourse.    Take it easy for the rest of the day. Keep arm or leg straight as much as possible. If catheter was put in your groin, avoid using stairs for a few days. When you must use stairs, step up with the leg that was not used for the angiogram.     Keep the catheter wound area clean and dry for 24 hours after your angiogram. You may shower 24 hours after your angiogram. Refrain from taking a tub bath, swimming, hot tubs, and submerging in dish water for 5 days. During shower, gently wash your angiogram site with soap and water. If the site is oozing or bleeding slightly, place a small bandage over it to protect your clothes.     If the place where the catheter was inserted starts to bleed, use your hand to put pressure on the bandage. It is better if someone else hold pressure for you. Also, call for help. Hold pressure for 30 minutes, even after bleeding stops. Lie flat for at least an hour. If bleeding does not stop within 15 minutes of holding pressure, you will need to go to the nearest hospital. Do not walk or drive yourself.     Drink plenty of liquids to help your body rid of the dye that was used during the angiogram. Drink eight (8 oz) glasses of water each day. Limit the amount of caffeine you drink such as coffee, tea, and soda. Do not drink alcohol for 24 hours after the test. Taking these actions are critical for the health of your kidneys.     REASONS TO CALL YOUR DOCTOR:    You have shaking, chills, or a body temperature over 101.5 F  The puncture site becomes red or has pus or foul-smelling drainage coming from it.  You have increasing pain at the puncture site. (It is normal to have soreness, but this should  get better, not worse).  You have questions or concerns about your angiogram, medicines, or health condition.     SEEK CARE IMMEDIATELY IF:  The leg or arm used for the angiogram becomes numb, is very painful, or changes color.  The bruise site starts to get bigger, or the area has new swelling.     IT IS AN EMERGENCY:  The puncture site is bleeding and you cannot stop the bleeding.  You have signs of a stroke..new weakness, trouble moving one side of your face or body, new trouble thinking or speaking, and new changes in vision.

## 2023-09-05 NOTE — H&P
Hospital Medicine  History & Physical Examination       Patient: Siobhan Scott Jr.  MRN: [id number]  STATUS: OP- Observation   DOS: 9/5/2023   PCP: Otis Estrada DO      CC: chest pain, sob       HISTORY OF PRESENT ILLNESS   73 y.o. male with a history that includes CAD w/ h/o MI, PAD, HLP, HTN, presented to cath lab today with dr torres for coronary angiogram. After he had abnormal nuclear med test outpatient with suggestive areas of ischemia seen. Found to have obstructive disease in LAD requiring HEATH x 2 overlapping today. Plan admit overnight for monitoring cont ivf hydration. Will resume home meds and cont aspirin and plavix.      REVIEW OF SYSTEMS     Except as documented, all other systems reviewed and negative       PAST MEDICAL HISTORY     Past Medical History:   Diagnosis Date    Abdominal aneurysm     5/22 3.6cmX3.8cm    Accelerating angina     Angina pectoris     Atherosclerosis of native arteries of extremities with intermittent claudication, right leg     CAD (coronary artery disease)     Carotid bruit     Chest pain     Claudication     COPD (chronic obstructive pulmonary disease)     Current smoker     Diabetic neuropathy     KELLY (dyspnea on exertion)     Elevated glucose     HLD (hyperlipidemia)     HTN (hypertension)     Obesity, Class I, BMI 30-34.9     Old MI (myocardial infarction)     Personal history of colonic polyps 08/13/2020    s/p polypectomy - Dr Keegan Sterling    Snores     SOB (shortness of breath)     Type 2 diabetes mellitus           PAST SURGICAL HISTORY     Past Surgical History:   Procedure Laterality Date    APPENDECTOMY      CARDIAC CATHETERIZATION Left 01/17/2020    Dr Herberth Mohan    CARDIAC CATHETERIZATION Left 02/14/2012    Dr Francisco Kemp    CATARACT EXTRACTION      COLONOSCOPY W/ BIOPSIES  11/26/2014    Dr Simeon Contreras    COLONOSCOPY W/ BIOPSIES AND POLYPECTOMY  08/13/2020    Dr Keegan Sterling    RETINAL DETACHMENT SURGERY      TONSILLECTOMY            FAMILY  "HISTORY     Reviewed, negative in relation to patient's current condition.      SOCIAL HISTORY       Social History     Tobacco Use    Smoking status: Every Day     Current packs/day: 0.50     Average packs/day: 0.5 packs/day for 40.0 years (20.0 ttl pk-yrs)     Types: Cigarettes     Start date: 1983    Smokeless tobacco: Never   Substance Use Topics    Alcohol use: Not Currently          ALLERGIES     Patient has no known allergies.      HOME MEDICATIONS     Current Outpatient Medications   Medication Instructions    albuterol (PROVENTIL/VENTOLIN HFA) 90 mcg/actuation inhaler INHALE 2 PUFFS BY MOUTH EVERY 4 TO 6 HOURS AS NEEDED    amLODIPine (NORVASC) 5 mg, Oral, Daily    aspirin (ECOTRIN) 81 mg, Oral, Daily    benazepriL (LOTENSIN) 10 mg, Oral, Daily    clopidogreL (PLAVIX) 75 mg, Oral, Daily    ezetimibe (ZETIA) 10 mg, Oral, Daily    fluticasone-umeclidin-vilanter (TRELEGY ELLIPTA) 100-62.5-25 mcg DsDv inhale 1 puff BY MOUTH ONCE DAILY    isosorbide mononitrate (IMDUR) 120 mg, Oral, Daily    metoprolol succinate (TOPROL-XL) 100 mg, Oral, Daily    nitroGLYCERIN (NITROSTAT) 0.4 mg, Sublingual, Every 5 min PRN, One tablet every 5mins X3 tablets in 15 minutes as needed for chest pain. If unrelieved after 3 tablets seek emergency care.    ranolazine (RANEXA) 1,000 mg, Oral, 2 times daily    rosuvastatin (CRESTOR) 40 MG Tab SMARTSI Tablet(s) By Mouth Every Evening          PHYSICAL EXAM   VITALS: T     /84   P 62   RR 16   O2 96 %    GENERAL: Awake and in NAD  HEENT: NC/AT, EOMI, PERRL.  NECK: Supple,  No JVD  LUNGS: CTA B/L  CVS: RRR, S1S2 positive  GI/: Soft, NT/ND, bowel sounds positive.  EXTREMITIES: Peripheral pulses 2+, no peripheral edema  DERM: Warm, dry.  No rashes or lesions noted.  NEURO: AAOx3, no focal neurologic deficit  PSYCH: Cooperative, appropriate mood and affect       DIAGNOSTICS     No results for input(s): "WBC", "RBC", "HGB", "HCT", "MCV", "MCH", "MCHC", "RDW", "PLT", "RETIC", " ""ESR" in the last 72 hours.  No results for input(s): "LACTIC" in the last 72 hours.  No results for input(s): "INR", "APTT", "D-DIMER" in the last 72 hours.  No results for input(s): "HGBA1C", "CHOL", "TRIG", "LDL", "VLDL", "HDL" in the last 72 hours.   No results for input(s): "NA", "K", "CHLORIDE", "CO2", "BUN", "CREATININE", "GLUCOSE", "CALCIUM", "MG", "PHOS", "ALBUMIN", "GLOBULIN", "ALKPHOS", "ALT", "AST", "BILITOT", "LIPASE", "CRP", "LDH", "HAPTOGLOBIN", "FERRITIN", "IRON", "TIBC", "TSH", "FREET4" in the last 72 hours.  No results for input(s): "BNP", "CPK", "TROPONINI" in the last 72 hours.       Cardiac catheterization  Narrative:   Successful IVUS and iFR guided PTCA, shockwave lithotripsy and   insertion of 2 overlapping drug-eluting stents (3.5 x 24 mm and 3.0 x 28   mm - postdilated proximally with a 5.0 NC, mid 4.0 NC and distally with a   3.5 NC) from 70-80% calcified proximal to mid LAD stenosis down to 0%   residual stenosis and KIERRA 3 flow    Patent stent noted in the diagonal 1 branch    Chronic total occlusion of the proximal left circumflex coronary   artery, as well as, obtuse marginal 1 branch.  This vessel fills via   left-to-left, as well as, right-to-left collaterals    30% eccentric disease in the proximal and mid RCA.  Widely patent stent   in the distal RCA.  Diffuse luminal irregularities in the PLB and PDA.    Normal LVEDP    The procedure log was verified by Freddie Ramon MD.    Date: 9/5/2023  Time: 11:38 AM    Procedure:   Selective coronary angiography  IFR LAD  IVUS LAD  Left heart catheterization  Percutaneous transluminal angioplasty, shockwave lithotripsy and stenting   of the proximal to mid LAD with 2 overlapping drug-eluting stents    Indication:  Angina.  Abnormal stress test.  Known CAD.     Consent: The patient was brought to the cardiac catheterization lab. Was   instructed and explained about the risk, benefit and alternatives of the   procedure included but not limited " to sudden cardiac death, myocardial   infarction, bleeding, vascular injury, renal failure, stroke, contrast   allergy, risk of conscious sedation and need for emergent bypass surgery.    the patient was agreeable to proceed.  Signed the consent form.  time-out was completed verifying correct patient, procedure, site,   positioning, and special equipment if applicable.     Access:  The patient was prepped using the usual sterile fashion.  Right radial artery  was accessed with micropuncture technique, ultrasound   guidance.  An image of the ultrasound was put in the paper chart for   purpose of documentation.  Sheath size:6F     Vitaly test:  Verified with pulse oximetry deemed to be adequate for radial   artery procedure.    Diagnostic catheters :  5 [place] citizen tiger catheter.  6 [place] citizen EBU 3.0 guide   catheter.  Five [place] citizen pigtail catheter    Coronary findings:    Dominance: right   Left main:  No obstructive disease with less than 10% epicardial stenosis  Left anterior descending artery:  Stent noted in the proximal LAD with   eccentric 70% stenosis.  80% stenosis noted in mid-LAD.  Diffuse luminal   irregularities in the distal LAD.  Large caliber diagonal 1 branch which   has widely patent stents  Circumflex artery:  Occluded in its proximal segment.  There is also on   obtuse marginal 1 branch which appears to be occluded in its proximal   segment.  There is left-to-left collaterals, as well as, right-to-left   collaterals noted which fills the distal left circumflex coronary artery,   as well as, obtuse marginal branches  Right coronary artery:  Right dominant system.  30% eccentric disease in   the proximal and mid RCA.  Diffuse luminal irregularities in the PLB and   PDA.    Hemodynamics: LV/AO = no gradient              LVEDP = 7 mmHg      Intervention:    Lesion description:   Stent noted in the proximal LAD with eccentric 70% stenosis.  80% stenosis   noted in mid-LAD.  Diffuse luminal irregularities in the  distal LAD with   KIERRA 3 flow.    The guiding catheter EBU 3.0  was advanced and selectively engaged the   coronary artery. Anticoagulation was initiated with Heparin . ACT was   therapeutic at 320.     After zeroing the flow wire outside the body, the flow wire was advanced   just outside the tip of the guiding catheter and normalized.  The wire was   advanced to the distal LAD across the lesion of interest and IFR was   0.88/0.89      The lesion was wired with a runthrough wire.  IVUS performed - did not cross the proximal lesion  Following that predilatation of the lesion was performed with 3.0 x 15 mm   scoreflex balloon at a maximum pressure of 12-20 atmospheres.   IVUS repeated and crossed: 70-80% stenosis with napkin ring calcium  IVL with a 3.5 x 12 mm balloon from the pLAD to mLAD (8 cycles of 10   pulses)  Stenting of the mLAD with a 3.0 x 28 mm HEATH  Stenting of the pLAD with a 3.5 x 24 mm HEATH  We ensured adequate stent overlap after diagonal branch.  Please note the   diagonal branch was protected with a Prowater wire.  Coronary intravascular ultrasound then performed.  Postdilatation of the   distal stent segment was then performed with a 3.5 NC at normal pressure.    Postdilatation of the mid stented segment was then performed with a 4.0 NC   at nominal pressure.  Postdilatation of the proximal stented segment was   then performed with a 5.0 NC a normal pressure.    Final angiography revealed 0% residual stenosis, excellent stent   apposition and sent expansion. Final angiography revealed 0% residual   stenosis and KIERRA 3 flow.  There was excellent KIERRA 3 flow down the   involved diagonal branch.  No need for any intervention.    Access Closure :    At the end of the procedure the sheath was removed. A TR band applied to   the right radial artery .  Excellent hemostasis achieved.  Impression:    Successful IVUS and iFR guided PTCA, shockwave lithotripsy and insertion   of 2 overlapping drug-eluting  stents (3.5 x 24 mm and 3.0 x 28 mm -   postdilated proximally with a 5.0 NC, mid 4.0 NC and distally with a 3.5   NC) from 70-80% calcified proximal to mid LAD stenosis down to 0% residual   stenosis and KIERRA 3 flow  Patent stent noted in the diagonal 1 branch  Chronic total occlusion of the proximal left circumflex coronary artery,   as well as, obtuse marginal 1 branch.  This vessel fills via left-to-left,   as well as, right-to-left collaterals  30% eccentric disease in the proximal and mid RCA.  Widely patent stent in   the distal RCA.  Diffuse luminal irregularities in the PLB and PDA.  Normal LVEDP    Plan:  DAPT with aspirin and plavix  Admit to observation   IV fluids    Freddie Ramon MD  X-Ray Chest PA And Lateral  Narrative: EXAMINATION:  STUDY: XR CHEST PA AND LATERAL    CLINICAL HISTORY AND TECHNIQUE:  The heart is moderately enlarged with vascular congestion and moderate changes of pulmonary edema with small bilateral pleural effusions layering dependently.    Saud Barnhart, RT on 9/5/2023  7:15 AMCURRENT CLINICAL HISTORY: OP  -PRE-OP FOR LEFT HEART CATH  -MID CHEST PAIN X YEARS    PAST MEDICAL HISTORY: COPD, SMOKER, CARDIAC STENTS    TECHNIQUE: 2 VIEWS OF CHEST    TECHNOLOGIST: REGINALD/ALLY    COMPARISON:  06/02/2021    FINDINGS:  A 1 cm, calcified granuloma is noted peripherally within the left mid lung field as seen previously and compatible with old granulomatous disease.  Mild changes of interstitial fibrosis are present with scattered areas of mild parenchymal scarring.The cardiac, hilar, and mediastinal contours appear unremarkable.I see no lobar or segmental infiltrates.No significant pleural effusions are noted.There is moderate demineralization of the skeletal structures with slight exaggeration of the thoracic kyphosis and minimal degenerative changes noted involving the thoracic spine.  Impression: 1. Chronic changes are present as described above and as seen previously including mild changes of  interstitial fibrosis and changes of old granulomatous disease.  2. I see no lobar or segmental infiltrates or other significant abnormalities.    Electronically signed by: Deepak Rodriguez  Date:    09/05/2023  Time:    08:05        ASSESSMENT   Chest pain, KELLY found have obstructive disease to LAD s/p 2 HEATH overlapping to LAD  CAD w/ h/o MI  PAD  HLP  HTN    PLAN   Admit for observation   Cont aspirin and plavix  IVF hydration overnight   Resume home meds  Reassess in AM     Prophylaxis: scd  Code Status: full code       David Pantoja MD  Hospital Medicine        If the patient has been admitted under observation status, it is at my discretion and under our care with hospital medicine services. [TWA]

## 2023-09-05 NOTE — NURSING
Patient arrived to the floor via wheelchair accompanied by TITA Ortiz. Patient in stable condition and denies further needs at this time.

## 2023-09-05 NOTE — Clinical Note
The groin and radials was prepped. The site was prepped with ChloraPrep. The site was clipped. The patient was draped.

## 2023-09-05 NOTE — Clinical Note
The catheter was inserted into the and was inserted over the wire into the proximal   left anterior descending.

## 2023-09-05 NOTE — PLAN OF CARE
Pt has been oriented to the unit. Pt has been told what will happen throughout the cath lab procedure process and was given time to ask questions.

## 2023-09-05 NOTE — Clinical Note
A percutaneous stick to the right radial vein was performed. Ultrasound guidance was used to obtain access.

## 2023-09-05 NOTE — Clinical Note
The catheter was repositioned into the ostium   right coronary artery. An angiography was performed of the right coronary arteries. Multiple views were taken. The angiography was performed via power injection. The injected amount was 10 mL contrast at 4 mL/s.

## 2023-09-06 LAB
ANION GAP SERPL CALC-SCNC: 9 MEQ/L (ref 2–13)
BUN SERPL-MCNC: 9 MG/DL (ref 7–20)
CALCIUM SERPL-MCNC: 9.3 MG/DL (ref 8.4–10.2)
CHLORIDE SERPL-SCNC: 101 MMOL/L (ref 98–110)
CO2 SERPL-SCNC: 26 MMOL/L (ref 21–32)
CREAT SERPL-MCNC: 0.69 MG/DL (ref 0.66–1.25)
CREAT/UREA NIT SERPL: 13 (ref 12–20)
GFR SERPLBLD CREATININE-BSD FMLA CKD-EPI: >90 MLS/MIN/1.73/M2
GLUCOSE SERPL-MCNC: 149 MG/DL (ref 70–115)
POTASSIUM SERPL-SCNC: 4.5 MMOL/L (ref 3.5–5.1)
SODIUM SERPL-SCNC: 136 MMOL/L (ref 135–145)

## 2023-09-06 PROCEDURE — G0378 HOSPITAL OBSERVATION PER HR: HCPCS

## 2023-09-06 PROCEDURE — 93010 ELECTROCARDIOGRAM REPORT: CPT | Mod: ,,, | Performed by: INTERNAL MEDICINE

## 2023-09-06 PROCEDURE — 25000242 PHARM REV CODE 250 ALT 637 W/ HCPCS: Performed by: FAMILY MEDICINE

## 2023-09-06 PROCEDURE — 36415 COLL VENOUS BLD VENIPUNCTURE: CPT | Performed by: INTERNAL MEDICINE

## 2023-09-06 PROCEDURE — 25000003 PHARM REV CODE 250: Performed by: INTERNAL MEDICINE

## 2023-09-06 PROCEDURE — 80048 BASIC METABOLIC PNL TOTAL CA: CPT | Performed by: INTERNAL MEDICINE

## 2023-09-06 PROCEDURE — 27000221 HC OXYGEN, UP TO 24 HOURS

## 2023-09-06 PROCEDURE — 94640 AIRWAY INHALATION TREATMENT: CPT

## 2023-09-06 PROCEDURE — 93005 ELECTROCARDIOGRAM TRACING: CPT

## 2023-09-06 PROCEDURE — 94761 N-INVAS EAR/PLS OXIMETRY MLT: CPT

## 2023-09-06 PROCEDURE — 93010 EKG 12-LEAD: ICD-10-PCS | Mod: ,,, | Performed by: INTERNAL MEDICINE

## 2023-09-06 PROCEDURE — 99900035 HC TECH TIME PER 15 MIN (STAT)

## 2023-09-06 RX ADMIN — RANOLAZINE 1000 MG: 500 TABLET, EXTENDED RELEASE ORAL at 09:09

## 2023-09-06 RX ADMIN — METOPROLOL SUCCINATE 100 MG: 50 TABLET, EXTENDED RELEASE ORAL at 09:09

## 2023-09-06 RX ADMIN — ISOSORBIDE MONONITRATE 120 MG: 60 TABLET, EXTENDED RELEASE ORAL at 09:09

## 2023-09-06 RX ADMIN — AMLODIPINE BESYLATE 5 MG: 5 TABLET ORAL at 09:09

## 2023-09-06 RX ADMIN — ALBUTEROL SULFATE 2.5 MG: 2.5 SOLUTION RESPIRATORY (INHALATION) at 07:09

## 2023-09-06 RX ADMIN — ASPIRIN 81 MG: 81 TABLET, COATED ORAL at 09:09

## 2023-09-06 RX ADMIN — EZETIMIBE 10 MG: 10 TABLET ORAL at 09:09

## 2023-09-06 RX ADMIN — CLOPIDOGREL BISULFATE 75 MG: 75 TABLET ORAL at 09:09

## 2023-09-06 NOTE — PROGRESS NOTES
[facility]-Med/[facility]urg    Cardiology  Progress Note    Patient Name: Siobhan Scott Jr.  MRN: [id number]  Admission Date: 9/5/2023  Hospital Length of Stay: 0 days  Code Status: No Order   Consulting Provider: Freddie Ramon MD  Primary Care Physician: Otis Estrada DO  Principal Problem:Abnormal nuclear stress test    Patient information was obtained from patient and ER records.     Subjective:     Chief Complaint:  post cath    HPI:  73-year-old male underwent coronary angiogram and found to have severe stenosis of proximal to mid LAD status post 2 overlapping drug-eluting stents on 09/05/2023.  Procedure was uncomplicated.  Had some mild chest discomfort during the procedure.  Decision made to admit the patient overnight for observation.  Course was relatively uncomplicated.  Denies any chest pain.  Was active.  Walking to the washroom.  Doing very well.  Tolerating aspirin and Plavix well.  Right radial access site clean dry and intact.    Creatinine stable.      Past Medical History:   Diagnosis Date    Abdominal aneurysm     5/22 3.6cmX3.8cm    Accelerating angina     Angina pectoris     Atherosclerosis of native arteries of extremities with intermittent claudication, right leg     CAD (coronary artery disease)     Carotid bruit     Chest pain     Claudication     COPD (chronic obstructive pulmonary disease)     Current smoker     Diabetic neuropathy     KELLY (dyspnea on exertion)     Elevated glucose     HLD (hyperlipidemia)     HTN (hypertension)     Obesity, Class I, BMI 30-34.9     Old MI (myocardial infarction)     Personal history of colonic polyps 08/13/2020    s/p polypectomy - Dr Keegan Sterling    Snores     SOB (shortness of breath)     Type 2 diabetes mellitus        Past Surgical History:   Procedure Laterality Date    APPENDECTOMY      CARDIAC CATHETERIZATION Left 01/17/2020    Dr Herberth Mohan    CARDIAC CATHETERIZATION Left 02/14/2012    Dr Francisco Kemp    CATARACT EXTRACTION       COLONOSCOPY W/ BIOPSIES  2014    Dr Simeon Contreras    COLONOSCOPY W/ BIOPSIES AND POLYPECTOMY  2020    Dr Keegan Sterling    RETINAL DETACHMENT SURGERY      TONSILLECTOMY         Review of patient's allergies indicates:  No Known Allergies    No current facility-administered medications on file prior to encounter.     Current Outpatient Medications on File Prior to Encounter   Medication Sig    albuterol (PROVENTIL/VENTOLIN HFA) 90 mcg/actuation inhaler INHALE 2 PUFFS BY MOUTH EVERY 4 TO 6 HOURS AS NEEDED    amLODIPine (NORVASC) 5 MG tablet Take 5 mg by mouth once daily.    aspirin (ECOTRIN) 81 MG EC tablet Take 81 mg by mouth once daily.    benazepriL (LOTENSIN) 10 MG tablet Take 10 mg by mouth once daily.    clopidogreL (PLAVIX) 75 mg tablet Take 75 mg by mouth once daily.    ezetimibe (ZETIA) 10 mg tablet Take 10 mg by mouth once daily.    fluticasone-umeclidin-vilanter (TRELEGY ELLIPTA) 100-62.5-25 mcg DsDv inhale 1 puff BY MOUTH ONCE DAILY    isosorbide mononitrate (IMDUR) 120 MG 24 hr tablet Take 120 mg by mouth once daily.    metoprolol succinate (TOPROL-XL) 100 MG 24 hr tablet Take 100 mg by mouth once daily.    ranolazine (RANEXA) 1,000 mg Tb12 Take 1,000 mg by mouth 2 (two) times daily.    rosuvastatin (CRESTOR) 40 MG Tab SMARTSI Tablet(s) By Mouth Every Evening    nitroGLYCERIN (NITROSTAT) 0.4 MG SL tablet Place 0.4 mg under the tongue every 5 (five) minutes as needed for Chest pain. One tablet every 5mins X3 tablets in 15 minutes as needed for chest pain. If unrelieved after 3 tablets seek emergency care.     Family History       Problem Relation (Age of Onset)    Heart attack Father    Hyperlipidemia Mother, Father, Brother    Hypertension Mother, Sister, Brother    Peripheral vascular disease Father    Rheum arthritis Mother          Tobacco Use    Smoking status: Every Day     Current packs/day: 0.50     Average packs/day: 0.5 packs/day for 40.0 years (20.0 ttl pk-yrs)     Types:  Cigarettes     Start date: 9/1/1983    Smokeless tobacco: Never   Substance and Sexual Activity    Alcohol use: Not Currently    Drug use: Never    Sexual activity: Not Currently     Partners: Female       Review of Systems   Constitutional: Negative.    HENT: Negative.     Eyes: Negative.    Respiratory: Negative.     Cardiovascular: Negative.    Gastrointestinal: Negative.    Endocrine: Negative.    Genitourinary: Negative.    Musculoskeletal: Negative.    Allergic/Immunologic: Negative.    Neurological: Negative.    Hematological: Negative.    Psychiatric/Behavioral: Negative.         Objective:     Vital Signs (Most Recent):  Temp: 98.6 °F (37 °C) (09/06/23 1111)  Pulse: 75 (09/06/23 1111)  Resp: 20 (09/06/23 1111)  BP: 110/63 (09/06/23 1111)  SpO2: (!) 94 % (09/06/23 1111) Vital Signs (24h Range):  Temp:  [97.1 °F (36.2 °C)-98.7 °F (37.1 °C)] 98.6 °F (37 °C)  Pulse:  [54-75] 75  Resp:  [16-20] 20  SpO2:  [90 %-96 %] 94 %  BP: (110-148)/(63-99) 110/63     Weight: 79 kg (174 lb 2.6 oz)  Body mass index is 29.9 kg/m².    SpO2: (!) 94 %         Intake/Output Summary (Last 24 hours) at 9/6/2023 1207  Last data filed at 9/5/2023 1630  Gross per 24 hour   Intake 1300 ml   Output 1550 ml   Net -250 ml       Lines/Drains/Airways       None                   Significant Labs:  Recent Results (from the past 72 hour(s))   POCT glucose    Collection Time: 09/05/23  7:38 AM   Result Value Ref Range    POCT Glucose 128 (H) 70 - 110 mg/dL   Basic metabolic panel    Collection Time: 09/06/23  5:15 AM   Result Value Ref Range    Sodium Level 136 135 - 145 mmol/L    Potassium Level 4.5 3.5 - 5.1 mmol/L    Chloride 101 98 - 110 mmol/L    Carbon Dioxide 26 21 - 32 mmol/L    Glucose Level 149 (H) 70 - 115 mg/dL    Blood Urea Nitrogen 9.0 7.0 - 20.0 mg/dL    Creatinine 0.69 0.66 - 1.25 mg/dL    BUN/Creatinine Ratio 13 12 - 20    Calcium Level Total 9.3 8.4 - 10.2 mg/dL    Anion Gap 9.0 2.0 - 13.0 mEq/L    eGFR >90 mls/min/1.73/m2        Significant Imaging:      EKG:  No results found for this visit on 09/05/23.    Telemetry:  sinus    Physical Exam  Constitutional:       Appearance: Normal appearance.   HENT:      Head: Normocephalic.      Right Ear: Ear canal normal.      Nose: Nose normal.      Mouth/Throat:      Mouth: Mucous membranes are moist.   Eyes:      Extraocular Movements: Extraocular movements intact.      Pupils: Pupils are equal, round, and reactive to light.   Cardiovascular:      Rate and Rhythm: Normal rate and regular rhythm.      Pulses: Normal pulses.   Pulmonary:      Effort: Pulmonary effort is normal.      Breath sounds: Normal breath sounds.   Abdominal:      General: Abdomen is flat.   Musculoskeletal:         General: Normal range of motion.      Cervical back: Normal range of motion.   Skin:     General: Skin is warm.      Capillary Refill: Capillary refill takes less than 2 seconds.   Neurological:      General: No focal deficit present.      Mental Status: He is alert and oriented to person, place, and time.   Psychiatric:         Mood and Affect: Mood normal.         Home Medications:   No current facility-administered medications on file prior to encounter.     Current Outpatient Medications on File Prior to Encounter   Medication Sig Dispense Refill    albuterol (PROVENTIL/VENTOLIN HFA) 90 mcg/actuation inhaler INHALE 2 PUFFS BY MOUTH EVERY 4 TO 6 HOURS AS NEEDED 18 g 11    amLODIPine (NORVASC) 5 MG tablet Take 5 mg by mouth once daily.      aspirin (ECOTRIN) 81 MG EC tablet Take 81 mg by mouth once daily.      benazepriL (LOTENSIN) 10 MG tablet Take 10 mg by mouth once daily.      clopidogreL (PLAVIX) 75 mg tablet Take 75 mg by mouth once daily.      ezetimibe (ZETIA) 10 mg tablet Take 10 mg by mouth once daily.      fluticasone-umeclidin-vilanter (TRELEGY ELLIPTA) 100-62.5-25 mcg DsDv inhale 1 puff BY MOUTH ONCE DAILY 60 each 11    isosorbide mononitrate (IMDUR) 120 MG 24 hr tablet Take 120 mg by mouth once  daily.      metoprolol succinate (TOPROL-XL) 100 MG 24 hr tablet Take 100 mg by mouth once daily.      ranolazine (RANEXA) 1,000 mg Tb12 Take 1,000 mg by mouth 2 (two) times daily.      rosuvastatin (CRESTOR) 40 MG Tab SMARTSI Tablet(s) By Mouth Every Evening      nitroGLYCERIN (NITROSTAT) 0.4 MG SL tablet Place 0.4 mg under the tongue every 5 (five) minutes as needed for Chest pain. One tablet every 5mins X3 tablets in 15 minutes as needed for chest pain. If unrelieved after 3 tablets seek emergency care.         Current Inpatient Medications:    Current Facility-Administered Medications:     albuterol nebulizer solution 2.5 mg, 2.5 mg, Nebulization, Q4H PRN, David Pantoja MD, 2.5 mg at 23    amLODIPine tablet 5 mg, 5 mg, Oral, Daily, Freddie Ramon MD, 5 mg at 23    aspirin EC tablet 81 mg, 81 mg, Oral, Daily, Freddie Ramon MD, 81 mg at 23    clopidogreL tablet 75 mg, 75 mg, Oral, Daily, Freddie Ramon MD, 75 mg at 23    ezetimibe tablet 10 mg, 10 mg, Oral, Daily, Freddie Ramon MD, 10 mg at 23    isosorbide mononitrate 24 hr tablet 120 mg, 120 mg, Oral, Daily, Freddie Ramon MD, 120 mg at 23    metoprolol succinate (TOPROL-XL) 24 hr tablet 100 mg, 100 mg, Oral, Daily, Freddie Ramon MD, 100 mg at 23    nitroGLYCERIN SL tablet 0.4 mg, 0.4 mg, Sublingual, Q5 Min PRN, Freddie Ramon MD    ranolazine 12 hr tablet 1,000 mg, 1,000 mg, Oral, BID, Freddie Ramon MD, 1,000 mg at 23    Current Outpatient Medications:     albuterol (PROVENTIL/VENTOLIN HFA) 90 mcg/actuation inhaler, INHALE 2 PUFFS BY MOUTH EVERY 4 TO 6 HOURS AS NEEDED, Disp: 18 g, Rfl: 11    amLODIPine (NORVASC) 5 MG tablet, Take 5 mg by mouth once daily., Disp: , Rfl:     aspirin (ECOTRIN) 81 MG EC tablet, Take 81 mg by mouth once daily., Disp: , Rfl:     benazepriL (LOTENSIN) 10 MG tablet, Take 10 mg by mouth once daily., Disp: , Rfl:     clopidogreL  (PLAVIX) 75 mg tablet, Take 75 mg by mouth once daily., Disp: , Rfl:     ezetimibe (ZETIA) 10 mg tablet, Take 10 mg by mouth once daily., Disp: , Rfl:     fluticasone-umeclidin-vilanter (TRELEGY ELLIPTA) 100-62.5-25 mcg DsDv, inhale 1 puff BY MOUTH ONCE DAILY, Disp: 60 each, Rfl: 11    isosorbide mononitrate (IMDUR) 120 MG 24 hr tablet, Take 120 mg by mouth once daily., Disp: , Rfl:     metoprolol succinate (TOPROL-XL) 100 MG 24 hr tablet, Take 100 mg by mouth once daily., Disp: , Rfl:     ranolazine (RANEXA) 1,000 mg Tb12, Take 1,000 mg by mouth 2 (two) times daily., Disp: , Rfl:     rosuvastatin (CRESTOR) 40 MG Tab, SMARTSI Tablet(s) By Mouth Every Evening, Disp: , Rfl:     nitroGLYCERIN (NITROSTAT) 0.4 MG SL tablet, Place 0.4 mg under the tongue every 5 (five) minutes as needed for Chest pain. One tablet every 5mins X3 tablets in 15 minutes as needed for chest pain. If unrelieved after 3 tablets seek emergency care., Disp: , Rfl:          VTE Risk Mitigation (From admission, onward)      None            Assessment:   Coronary artery disease.  PTCA and HEATH by 2 to the proximal to mid LAD 2023.  Patent diagonal stent.  Patent distal RCA stent.  Left circumflex coronary artery     Dyslipidemia    Hypertensive    Tobacco abuse    Plan:   Okay to discharge home today  Continue aspirin and Plavix uninterrupted  Continue all home medications including amlodipine, Ranexa, Imdur, metoprolol and benazepril  Right radial precautions  Follow-up in the office as scheduled  Sublingual nitroglycerin p.r.n. for chest pain      Freddie Ramon MD  Cardiology  [facility]  2023 12:07 PM

## 2023-09-06 NOTE — PLAN OF CARE
Problem: Adult Inpatient Plan of Care  Goal: Plan of Care Review  Outcome: Ongoing, Progressing  Goal: Patient-Specific Goal (Individualized)  Outcome: Ongoing, Progressing  Goal: Absence of Hospital-Acquired Illness or Injury  Outcome: Ongoing, Progressing  Goal: Optimal Comfort and Wellbeing  Outcome: Ongoing, Progressing  Goal: Readiness for Transition of Care  Outcome: Ongoing, Progressing     Problem: Fall Injury Risk  Goal: Absence of Fall and Fall-Related Injury  Outcome: Ongoing, Progressing     Problem: Activity Intolerance  Goal: Enhanced Capacity and Energy  Outcome: Ongoing, Progressing

## 2023-09-06 NOTE — DISCHARGE SUMMARY
Hospital Medicine  Discharge Summary    Patient Name: Siobhan Scott Jr.  MRN: [id number]  Admit Date: 9/5/2023  Discharge Date:    Status: OP- Observation   Length of Stay: 0      PHYSICIANS   Admitting Physician: David Pantoja MD  Discharging Physician: David Pantoja MD.  Primary Care Physician: Otis Estrada,         DISCHARGE DIAGNOSES   Chest pain, KELLY found have obstructive disease to LAD s/p 2 HEATH overlapping to LAD  CAD w/ h/o MI  PAD  HLP  HTN      PROCEDURES         HOSPITAL COURSE      73 y.o. male with a history that includes CAD w/ h/o MI, PAD, HLP, HTN, presented to cath lab today with dr torres for coronary angiogram. After he had abnormal nuclear med test outpatient with suggestive areas of ischemia seen. Found to have obstructive disease in LAD requiring HEATH x 2 overlapping today. Plan admit overnight for monitoring cont ivf hydration. Will resume home meds and cont aspirin and plavix    STATUS  Improved, Stable    DISPOSITION  Discharge to home     DIET  Cardiac     ACTIVITY  As tolerated      FOLLOW-UP      Follow-up Information       Devon Allen FNP Follow up on 9/12/2023.    Specialty: Cardiology  Why: FOLLOW UP WITH DEVON ALLEN ON 9/12/2023 AT 1:00 PM [facility]  Contact information:  [address]  [facility] [id number] [phone]                               DISCHARGE MEDICATION RECONCILIATION        Medication List        CONTINUE taking these medications      albuterol 90 mcg/actuation inhaler  Commonly known as: PROVENTIL/VENTOLIN HFA  INHALE 2 PUFFS BY MOUTH EVERY 4 TO 6 HOURS AS NEEDED     amLODIPine 5 MG tablet  Commonly known as: NORVASC     aspirin 81 MG EC tablet  Commonly known as: ECOTRIN     benazepriL 10 MG tablet  Commonly known as: LOTENSIN     clopidogreL 75 mg tablet  Commonly known as: PLAVIX     ezetimibe 10 mg tablet  Commonly known as: ZETIA     isosorbide mononitrate 120 MG 24 hr tablet  Commonly known as: IMDUR     metoprolol succinate 100 MG  "24 hr tablet  Commonly known as: TOPROL-XL     nitroGLYCERIN 0.4 MG SL tablet  Commonly known as: NITROSTAT     ranolazine 1,000 mg Tb12  Commonly known as: RANEXA     rosuvastatin 40 MG Tab  Commonly known as: CRESTOR     TRELEGY ELLIPTA 100-62.5-25 mcg Dsdv  Generic drug: fluticasone-umeclidin-vilanter  inhale 1 puff BY MOUTH ONCE DAILY                PHYSICAL EXAM   VITALS: T 98.6 °F (37 °C)   /76   P 72   RR 20   O2 (!) 94 %    Physical Exam  Vitals reviewed.   HENT:      Head: Normocephalic.   Cardiovascular:      Rate and Rhythm: Normal rate.   Pulmonary:      Effort: Pulmonary effort is normal.   Neurological:      Mental Status: He is alert.              Discharge time: 33 minutes     David Pantoja MD  [facility]       DIAGNOSITCS   CBC:   No results for input(s): "WBC", "HGB", "HCT", "PLT", "NEUTOPHILPCT", "LYMPH", "MONO", "EOSINOPHIL" in the last 168 hours.  COAG:  No results for input(s): "APTT", "INR", "PTT" in the last 168 hours.  CMP:   Recent Labs   Lab 09/06/23  0515   CALCIUM 9.3      K 4.5   CO2 26   BUN 9.0   CREATININE 0.69     Estimated Creatinine Clearance: 89.1 mL/min (based on SCr of 0.69 mg/dL).  CARDIAC ENZYMES: No results for input(s): "TROPONINI", "CPK", "CKMB" in the last 72 hours.     No results for input(s): "AMYLASE", "LIPASE" in the last 168 hours.      Recent Labs     09/05/23  0738   POCTGLUCOSE 128*        Microbiology Results (last 7 days)       ** No results found for the last 168 hours. **               Cardiac catheterization    Result Date: 9/5/2023    Successful IVUS and iFR guided PTCA, shockwave lithotripsy and insertion of 2 overlapping drug-eluting stents (3.5 x 24 mm and 3.0 x 28 mm - postdilated proximally with a 5.0 NC, mid 4.0 NC and distally with a 3.5 NC) from 70-80% calcified proximal to mid LAD stenosis down to 0% residual stenosis and KIERRA 3 flow   Patent stent noted in the diagonal 1 branch   Chronic total occlusion of the proximal left " circumflex coronary artery, as well as, obtuse marginal 1 branch.  This vessel fills via left-to-left, as well as, right-to-left collaterals   30% eccentric disease in the proximal and mid RCA.  Widely patent stent in the distal RCA.  Diffuse luminal irregularities in the PLB and PDA.   Normal LVEDP The procedure log was verified by Freddie aRmon MD. Date: 9/5/2023  Time: 11:38 AM Procedure: Selective coronary angiography IFR LAD IVUS LAD Left heart catheterization Percutaneous transluminal angioplasty, shockwave lithotripsy and stenting of the proximal to mid LAD with 2 overlapping drug-eluting stents Indication:  Angina.  Abnormal stress test.  Known CAD. Consent: The patient was brought to the cardiac catheterization lab. Was instructed and explained about the risk, benefit and alternatives of the procedure included but not limited to sudden cardiac death, myocardial infarction, bleeding, vascular injury, renal failure, stroke, contrast allergy, risk of conscious sedation and need for emergent bypass surgery.  the patient was agreeable to proceed.  Signed the consent form. time-out was completed verifying correct patient, procedure, site, positioning, and special equipment if applicable. Access:  The patient was prepped using the usual sterile fashion. Right radial artery  was accessed with micropuncture technique, ultrasound guidance.  An image of the ultrasound was put in the paper chart for purpose of documentation. Sheath size:6F Vitaly test:  Verified with pulse oximetry deemed to be adequate for radial artery procedure. Diagnostic catheters :  5 Taiwanese tiger catheter.  6 Taiwanese EBU 3.0 guide catheter.  Five Taiwanese pigtail catheter Coronary findings: Dominance: right Left main:  No obstructive disease with less than 10% epicardial stenosis Left anterior descending artery:  Stent noted in the proximal LAD with eccentric 70% stenosis.  80% stenosis noted in mid-LAD.  Diffuse luminal irregularities in the  distal LAD.  Large caliber diagonal 1 branch which has widely patent stents Circumflex artery:  Occluded in its proximal segment.  There is also on obtuse marginal 1 branch which appears to be occluded in its proximal segment.  There is left-to-left collaterals, as well as, right-to-left collaterals noted which fills the distal left circumflex coronary artery, as well as, obtuse marginal branches Right coronary artery:  Right dominant system.  30% eccentric disease in the proximal and mid RCA.  Diffuse luminal irregularities in the PLB and PDA. Hemodynamics: LV/AO = no gradient              LVEDP = 7 mmHg  Intervention: Lesion description: Stent noted in the proximal LAD with eccentric 70% stenosis.  80% stenosis noted in mid-LAD.  Diffuse luminal irregularities in the distal LAD with KIERRA 3 flow. The guiding catheter EBU 3.0  was advanced and selectively engaged the coronary artery. Anticoagulation was initiated with Heparin . ACT was therapeutic at 320. After zeroing the flow wire outside the body, the flow wire was advanced just outside the tip of the guiding catheter and normalized.  The wire was advanced to the distal LAD across the lesion of interest and IFR was 0.88/0.89  The lesion was wired with a runthrough wire. IVUS performed - did not cross the proximal lesion Following that predilatation of the lesion was performed with 3.0 x 15 mm scoreflex balloon at a maximum pressure of 12-20 atmospheres. IVUS repeated and crossed: 70-80% stenosis with napkin ring calcium IVL with a 3.5 x 12 mm balloon from the pLAD to mLAD (8 cycles of 10 pulses) Stenting of the mLAD with a 3.0 x 28 mm HEATH Stenting of the pLAD with a 3.5 x 24 mm HEATH We ensured adequate stent overlap after diagonal branch.  Please note the diagonal branch was protected with a Prowater wire. Coronary intravascular ultrasound then performed.  Postdilatation of the distal stent segment was then performed with a 3.5 NC at normal pressure.   Postdilatation of the mid stented segment was then performed with a 4.0 NC at nominal pressure.  Postdilatation of the proximal stented segment was then performed with a 5.0 NC a normal pressure. Final angiography revealed 0% residual stenosis, excellent stent apposition and sent expansion. Final angiography revealed 0% residual stenosis and KIERRA 3 flow.  There was excellent KIERRA 3 flow down the involved diagonal branch.  No need for any intervention. Access Closure :  At the end of the procedure the sheath was removed. A TR band applied to the right radial artery . Excellent hemostasis achieved.      Successful IVUS and iFR guided PTCA, shockwave lithotripsy and insertion of 2 overlapping drug-eluting stents (3.5 x 24 mm and 3.0 x 28 mm - postdilated proximally with a 5.0 NC, mid 4.0 NC and distally with a 3.5 NC) from 70-80% calcified proximal to mid LAD stenosis down to 0% residual stenosis and KIERRA 3 flow Patent stent noted in the diagonal 1 branch Chronic total occlusion of the proximal left circumflex coronary artery, as well as, obtuse marginal 1 branch.  This vessel fills via left-to-left, as well as, right-to-left collaterals 30% eccentric disease in the proximal and mid RCA.  Widely patent stent in the distal RCA.  Diffuse luminal irregularities in the PLB and PDA. Normal LVEDP Plan: DAPT with aspirin and plavix Admit to observation IV fluids Freddie Ramon MD     X-Ray Chest PA And Lateral    Result Date: 9/5/2023  EXAMINATION: STUDY: XR CHEST PA AND LATERAL CLINICAL HISTORY AND TECHNIQUE: The heart is moderately enlarged with vascular congestion and moderate changes of pulmonary edema with small bilateral pleural effusions layering dependently. Saud Barnhart, RT on 9/5/2023  7:15 AMCURRENT CLINICAL HISTORY: OP -PRE-OP FOR LEFT HEART CATH -MID CHEST PAIN X YEARS PAST MEDICAL HISTORY: COPD, SMOKER, CARDIAC STENTS TECHNIQUE: 2 VIEWS OF CHEST TECHNOLOGIST: REGINALD/ALLY COMPARISON: 06/02/2021 FINDINGS: A 1 cm,  calcified granuloma is noted peripherally within the left mid lung field as seen previously and compatible with old granulomatous disease.  Mild changes of interstitial fibrosis are present with scattered areas of mild parenchymal scarring.The cardiac, hilar, and mediastinal contours appear unremarkable.I see no lobar or segmental infiltrates.No significant pleural effusions are noted.There is moderate demineralization of the skeletal structures with slight exaggeration of the thoracic kyphosis and minimal degenerative changes noted involving the thoracic spine.     1. Chronic changes are present as described above and as seen previously including mild changes of interstitial fibrosis and changes of old granulomatous disease. 2. I see no lobar or segmental infiltrates or other significant abnormalities. Electronically signed by: Deepak Rodriguez Date:    09/05/2023 Time:    08:05

## 2023-09-06 NOTE — PLAN OF CARE
09/06/23 1133   Discharge Assessment   Assessment Type Discharge Planning Assessment   Confirmed/corrected address, phone number and insurance Yes   Confirmed Demographics Correct on Facesheet   Source of Information patient   Does patient/caregiver understand observation status Yes   Communicated ERIN with patient/caregiver Yes   Reason For Admission heart cath   People in Home spouse   Do you expect to return to your current living situation? Yes   Do you have help at home or someone to help you manage your care at home? Yes   Who are your caregiver(s) and their phone number(s)? wife Caitie Scott [phone]   Prior to hospitilization cognitive status: Alert/Oriented   Current cognitive status: Alert/Oriented   Equipment Currently Used at Home none   Readmission within 30 days? No   Patient currently being followed by outpatient case management? No   Do you currently have service(s) that help you manage your care at home? No   Do you take prescription medications? Yes   Do you have prescription coverage? Yes   Coverage [employer]   Do you have any problems affording any of your prescribed medications? No   Is the patient taking medications as prescribed? yes   Who is going to help you get home at discharge? son   How do you get to doctors appointments? car, drives self   Are you on dialysis? No   Do you take coumadin? No   DME Needed Upon Discharge  none   Discharge Plan discussed with: Patient   Transition of Care Barriers None   Discharge Plan A Home   Physical Activity   On average, how many days per week do you engage in moderate to strenuous exercise (like a brisk walk)? 0 days   On average, how many minutes do you engage in exercise at this level? 0 min   Financial Resource Strain   How hard is it for you to pay for the very basics like food, housing, medical care, and heating? Not hard   Housing Stability   In the last 12 months, was there a time when you were not able to pay the mortgage or rent on  time? N   In the last 12 months, how many places have you lived? 1   In the last 12 months, was there a time when you did not have a steady place to sleep or slept in a shelter (including now)? N   Transportation Needs   In the past 12 months, has lack of transportation kept you from medical appointments or from getting medications? no   In the past 12 months, has lack of transportation kept you from meetings, work, or from getting things needed for daily living? No   Food Insecurity   Within the past 12 months, the food you bought just didn't last and you didn't have money to get more. Never true   Stress   Do you feel stress - tense, restless, nervous, or anxious, or unable to sleep at night because your mind is troubled all the time - these days? Not at all   Social Connections   How often do you get together with friends or relatives? More than 3   How often do you attend Sabianist or Religion services? Never   Do you belong to any clubs or organizations such as Sabianist groups, unions, fraternal or athletic groups, or school groups? No   How often do you attend meetings of the clubs or organizations you belong to? Never   Are you , , , , never , or living with a partner?    Alcohol Use   Q1: How often do you have a drink containing alcohol? Never   Q2: How many drinks containing alcohol do you have on a typical day when you are drinking? None   Q3: How often do you have six or more drinks on one occasion? Never

## 2023-09-06 NOTE — PLAN OF CARE
09/06/23 1255   Final Note   Assessment Type Final Discharge Note   Anticipated Discharge Disposition Home   What phone number can be called within the next 1-3 days to see how you are doing after discharge? [phone]   Post-Acute Status   Discharge Delays None known at this time

## 2023-09-07 VITALS
HEIGHT: 64 IN | BODY MASS INDEX: 29.74 KG/M2 | DIASTOLIC BLOOD PRESSURE: 63 MMHG | SYSTOLIC BLOOD PRESSURE: 110 MMHG | TEMPERATURE: 99 F | WEIGHT: 174.19 LBS | HEART RATE: 75 BPM | OXYGEN SATURATION: 94 % | RESPIRATION RATE: 20 BRPM

## 2023-09-21 ENCOUNTER — OFFICE VISIT (OUTPATIENT)
Dept: FAMILY MEDICINE | Facility: CLINIC | Age: 73
End: 2023-09-21
Payer: MEDICARE

## 2023-09-21 VITALS
DIASTOLIC BLOOD PRESSURE: 80 MMHG | BODY MASS INDEX: 29.84 KG/M2 | TEMPERATURE: 98 F | WEIGHT: 174.81 LBS | HEIGHT: 64 IN | SYSTOLIC BLOOD PRESSURE: 127 MMHG | HEART RATE: 62 BPM | RESPIRATION RATE: 18 BRPM | OXYGEN SATURATION: 94 %

## 2023-09-21 DIAGNOSIS — I25.10 CORONARY ARTERY DISEASE INVOLVING NATIVE CORONARY ARTERY OF NATIVE HEART, UNSPECIFIED WHETHER ANGINA PRESENT: Primary | ICD-10-CM

## 2023-09-21 DIAGNOSIS — Z72.0 TOBACCO ABUSE: ICD-10-CM

## 2023-09-21 PROBLEM — I77.9 PERIPHERAL ARTERIAL OCCLUSIVE DISEASE: Status: ACTIVE | Noted: 2023-09-21

## 2023-09-21 PROBLEM — E66.9 OBESITY: Status: ACTIVE | Noted: 2023-09-21

## 2023-09-21 PROCEDURE — 4010F PR ACE/ARB THEARPY RXD/TAKEN: ICD-10-PCS | Mod: CPTII,,, | Performed by: FAMILY MEDICINE

## 2023-09-21 PROCEDURE — 1160F RVW MEDS BY RX/DR IN RCRD: CPT | Mod: CPTII,,, | Performed by: FAMILY MEDICINE

## 2023-09-21 PROCEDURE — 3074F SYST BP LT 130 MM HG: CPT | Mod: CPTII,,, | Performed by: FAMILY MEDICINE

## 2023-09-21 PROCEDURE — 3044F PR MOST RECENT HEMOGLOBIN A1C LEVEL <7.0%: ICD-10-PCS | Mod: CPTII,,, | Performed by: FAMILY MEDICINE

## 2023-09-21 PROCEDURE — 3079F PR MOST RECENT DIASTOLIC BLOOD PRESSURE 80-89 MM HG: ICD-10-PCS | Mod: CPTII,,, | Performed by: FAMILY MEDICINE

## 2023-09-21 PROCEDURE — 3079F DIAST BP 80-89 MM HG: CPT | Mod: CPTII,,, | Performed by: FAMILY MEDICINE

## 2023-09-21 PROCEDURE — 3044F HG A1C LEVEL LT 7.0%: CPT | Mod: CPTII,,, | Performed by: FAMILY MEDICINE

## 2023-09-21 PROCEDURE — 3008F BODY MASS INDEX DOCD: CPT | Mod: CPTII,,, | Performed by: FAMILY MEDICINE

## 2023-09-21 PROCEDURE — 99214 OFFICE O/P EST MOD 30 MIN: CPT | Mod: ,,, | Performed by: FAMILY MEDICINE

## 2023-09-21 PROCEDURE — 3074F PR MOST RECENT SYSTOLIC BLOOD PRESSURE < 130 MM HG: ICD-10-PCS | Mod: CPTII,,, | Performed by: FAMILY MEDICINE

## 2023-09-21 PROCEDURE — 1160F PR REVIEW ALL MEDS BY PRESCRIBER/CLIN PHARMACIST DOCUMENTED: ICD-10-PCS | Mod: CPTII,,, | Performed by: FAMILY MEDICINE

## 2023-09-21 PROCEDURE — 1126F PR PAIN SEVERITY QUANTIFIED, NO PAIN PRESENT: ICD-10-PCS | Mod: CPTII,,, | Performed by: FAMILY MEDICINE

## 2023-09-21 PROCEDURE — 1159F PR MEDICATION LIST DOCUMENTED IN MEDICAL RECORD: ICD-10-PCS | Mod: CPTII,,, | Performed by: FAMILY MEDICINE

## 2023-09-21 PROCEDURE — 1126F AMNT PAIN NOTED NONE PRSNT: CPT | Mod: CPTII,,, | Performed by: FAMILY MEDICINE

## 2023-09-21 PROCEDURE — 4010F ACE/ARB THERAPY RXD/TAKEN: CPT | Mod: CPTII,,, | Performed by: FAMILY MEDICINE

## 2023-09-21 PROCEDURE — 99214 PR OFFICE/OUTPT VISIT, EST, LEVL IV, 30-39 MIN: ICD-10-PCS | Mod: ,,, | Performed by: FAMILY MEDICINE

## 2023-09-21 PROCEDURE — 1159F MED LIST DOCD IN RCRD: CPT | Mod: CPTII,,, | Performed by: FAMILY MEDICINE

## 2023-09-21 PROCEDURE — 3008F PR BODY MASS INDEX (BMI) DOCUMENTED: ICD-10-PCS | Mod: CPTII,,, | Performed by: FAMILY MEDICINE

## 2023-09-21 NOTE — PROGRESS NOTES
Patient ID: [id number]     Chief Complaint: Follow-up        HPI:     Siobhan Scott Jr. is a 73 y.o. male here today for Follow-up for chronic medical conditions including coronary artery disease. Recent drug eluding stent placed and patient is feeling much better. Reports improvement in his headaches and no more chest pains. Upcoming AAA screen.     ----------------------------  Abdominal aneurysm      Comment:  5/22 3.6cmX3.8cm  Accelerating angina  Angina pectoris  Atherosclerosis of native arteries of extremities with intermittent   claudication, right leg  CAD (coronary artery disease)  Carotid bruit  Chest pain  Claudication  COPD (chronic obstructive pulmonary disease)  Current smoker  Diabetic neuropathy  KELLY (dyspnea on exertion)  Elevated glucose  HLD (hyperlipidemia)  HTN (hypertension)  Obesity, Class I, BMI 30-34.9  Old MI (myocardial infarction)  Personal history of colonic polyps      Comment:  s/p polypectomy - Dr Keegan Sterling  Snores  SOB (shortness of breath)  Type 2 diabetes mellitus     Past Surgical History:   Procedure Laterality Date    APPENDECTOMY      CARDIAC CATHETERIZATION Left 01/17/2020    Dr Herberth Mohan    CARDIAC CATHETERIZATION Left 02/14/2012    Dr Francisco Kemp    CATARACT EXTRACTION      COLONOSCOPY W/ BIOPSIES  11/26/2014    Dr Simeon Contreras    COLONOSCOPY W/ BIOPSIES AND POLYPECTOMY  08/13/2020    Dr Keegan Sterling    CORONARY ANGIOGRAPHY Right 09/05/2023    Dr Freddie Ramon    CORONARY STENT PLACEMENT N/A 09/05/2023    Dr Freddie Ramon    INSTANTANEOUS WAVE-FREE RATIO (IFR) N/A 09/05/2023    Dr Freddie Ramon    IVUS, CORONARY  09/05/2023    Dr Freddie Ramon    LEFT HEART CATHETERIZATION Left 09/05/2023    Dr Freddie Ramon    RETINAL DETACHMENT SURGERY      TONSILLECTOMY      ULTRASONIC LITHOTRIPSY  09/05/2023    Dr Freddie Ramon       Review of patient's allergies indicates:  No Known Allergies    Outpatient Medications Marked as Taking for the 9/21/23 encounter (Office Visit) with  Otis Estrada,    Medication Sig Dispense Refill    albuterol (PROVENTIL/VENTOLIN HFA) 90 mcg/actuation inhaler INHALE 2 PUFFS BY MOUTH EVERY 4 TO 6 HOURS AS NEEDED 18 g 11    amLODIPine (NORVASC) 5 MG tablet Take 5 mg by mouth once daily.      aspirin (ECOTRIN) 81 MG EC tablet Take 81 mg by mouth once daily.      benazepriL (LOTENSIN) 10 MG tablet Take 10 mg by mouth once daily.      clopidogreL (PLAVIX) 75 mg tablet Take 75 mg by mouth once daily.      ezetimibe (ZETIA) 10 mg tablet Take 10 mg by mouth once daily.      fluticasone-umeclidin-vilanter (TRELEGY ELLIPTA) 100-62.5-25 mcg DsDv inhale 1 puff BY MOUTH ONCE DAILY 60 each 11    isosorbide mononitrate (IMDUR) 120 MG 24 hr tablet Take 120 mg by mouth once daily.      metoprolol succinate (TOPROL-XL) 100 MG 24 hr tablet Take 100 mg by mouth once daily.      nitroGLYCERIN (NITROSTAT) 0.4 MG SL tablet Place 0.4 mg under the tongue every 5 (five) minutes as needed for Chest pain. One tablet every 5mins X3 tablets in 15 minutes as needed for chest pain. If unrelieved after 3 tablets seek emergency care.      ranolazine (RANEXA) 1,000 mg Tb12 Take 1,000 mg by mouth 2 (two) times daily.      rosuvastatin (CRESTOR) 40 MG Tab SMARTSI Tablet(s) By Mouth Every Evening         Social History     Socioeconomic History    Marital status:    Tobacco Use    Smoking status: Every Day     Current packs/day: 0.50     Average packs/day: 0.5 packs/day for 40.1 years (20.0 ttl pk-yrs)     Types: Cigarettes     Start date: 1983    Smokeless tobacco: Never   Substance and Sexual Activity    Alcohol use: Not Currently    Drug use: Never    Sexual activity: Not Currently     Partners: Female     Social Determinants of Health     Financial Resource Strain: Low Risk  (2023)    Overall Financial Resource Strain (CARDIA)     Difficulty of Paying Living Expenses: Not hard at all   Food Insecurity: No Food Insecurity (2023)    Hunger Vital Sign     Worried About  Running Out of Food in the Last Year: Never true     Ran Out of Food in the Last Year: Never true   Transportation Needs: No Transportation Needs (9/6/2023)    PRAPARE - Transportation     Lack of Transportation (Medical): No     Lack of Transportation (Non-Medical): No   Physical Activity: Inactive (9/6/2023)    Exercise Vital Sign     Days of Exercise per Week: 0 days     Minutes of Exercise per Session: 0 min   Stress: No Stress Concern Present (9/6/2023)    [employer] [place] [employer] - Occupational Stress Questionnaire     Feeling of Stress : Not at all   Social Connections: Moderately Isolated (9/6/2023)    Social Connection and Isolation Panel [NHANES]     Frequency of Social Gatherings with Friends and Family: More than three times a week     Attends Yarsani Services: Never     Active Member of Clubs or Organizations: No     Attends Club or Organization Meetings: Never     Marital Status:    Housing Stability: Low Risk  (9/6/2023)    Housing Stability Vital Sign     Unable to Pay for Housing in the Last Year: No     Number of Places Lived in the Last Year: 1     Unstable Housing in the Last Year: No        Family History   Problem Relation Age of Onset    Hypertension Mother     Hyperlipidemia Mother     Rheum arthritis Mother     Heart attack Father     Hyperlipidemia Father     Peripheral vascular disease Father     Hypertension Sister     Hypertension Brother     Hyperlipidemia Brother         Patient Care Team:  Otis Estrada DO as PCP - General (Family Medicine)  Eliseo Panchal FNP as Nurse Practitioner (Cardiology)  Grant Whitehead MD as Consulting Physician (Pulmonary Disease)  Onel Contreras MD as Consulting Physician (Gastroenterology)  Edna Whitehead MD (Ophthalmology)  Keegan Sterling MD as Consulting Physician (Gastroenterology)     Subjective:     Review of Systems   Constitutional:  Negative for chills and fever.   Respiratory:  Negative for  "shortness of breath.    Cardiovascular:  Negative for chest pain.   Gastrointestinal:  Negative for abdominal pain, constipation and diarrhea.   Neurological:  Negative for dizziness and headaches.   Psychiatric/Behavioral:  The patient does not have insomnia.        See HPI for details  All Other ROS: Negative except as stated in HPI.       Objective:     /80   Pulse 62   Temp 98 °F (36.7 °C) (Tympanic)   Resp 18   Ht 5' 3.78" (1.62 m)   Wt 79.3 kg (174 lb 12.8 oz)   SpO2 (!) 94%   BMI 30.21 kg/m²     Physical Exam  Vitals reviewed.   Constitutional:       General: He is not in acute distress.     Appearance: Normal appearance. He is not ill-appearing.   Cardiovascular:      Rate and Rhythm: Normal rate and regular rhythm.      Pulses: Normal pulses.      Heart sounds: Normal heart sounds. No murmur heard.     No friction rub. No gallop.   Pulmonary:      Effort: No respiratory distress.      Breath sounds: No wheezing, rhonchi or rales.   Musculoskeletal:         General: No swelling.      Right lower leg: No edema.      Left lower leg: No edema.   Skin:     General: Skin is warm and dry.   Neurological:      General: No focal deficit present.      Mental Status: He is alert.   Psychiatric:         Mood and Affect: Mood normal.         Behavior: Behavior normal.         Assessment/Plan:     1. Coronary artery disease involving native coronary artery of native heart, unspecified whether angina present    2. Tobacco abuse    Advised patient to reduce his smoking before further problems like a AAA, worsening COPD, or further heart disease develops or worsens.     Symptoms improved with recent stent placement. Will continue to monitor symptoms. Continue current medications as prescribed.     Follow up:     Follow up if symptoms worsen or fail to improve. In addition to their scheduled follow up, the patient has also been instructed to follow up on as needed basis.         "

## 2023-09-25 ENCOUNTER — LAB VISIT (OUTPATIENT)
Dept: LAB | Facility: HOSPITAL | Age: 73
End: 2023-09-25
Attending: NURSE PRACTITIONER
Payer: MEDICARE

## 2023-09-25 DIAGNOSIS — E78.5 HYPERLIPIDEMIA, UNSPECIFIED HYPERLIPIDEMIA TYPE: Primary | ICD-10-CM

## 2023-09-25 LAB
CHOLEST SERPL-MCNC: 151 MG/DL (ref 0–200)
HDLC SERPL-MCNC: 70 MG/DL (ref 40–60)
LDLC SERPL DIRECT ASSAY-SCNC: 72.7 MG/DL (ref 30–100)
TRIGL SERPL-MCNC: 115 MG/DL (ref 30–200)

## 2023-09-25 PROCEDURE — 36415 COLL VENOUS BLD VENIPUNCTURE: CPT

## 2023-09-25 PROCEDURE — 80061 LIPID PANEL: CPT

## 2024-01-30 DIAGNOSIS — J44.9 CHRONIC OBSTRUCTIVE PULMONARY DISEASE, UNSPECIFIED: ICD-10-CM

## 2024-01-30 RX ORDER — ALBUTEROL SULFATE 90 UG/1
AEROSOL, METERED RESPIRATORY (INHALATION)
Qty: 54 G | Refills: 3 | Status: SHIPPED | OUTPATIENT
Start: 2024-01-30 | End: 2024-03-27 | Stop reason: SDUPTHER

## 2024-03-27 ENCOUNTER — OFFICE VISIT (OUTPATIENT)
Dept: FAMILY MEDICINE | Facility: CLINIC | Age: 74
End: 2024-03-27
Payer: MEDICARE

## 2024-03-27 VITALS
OXYGEN SATURATION: 97 % | DIASTOLIC BLOOD PRESSURE: 68 MMHG | TEMPERATURE: 97 F | HEIGHT: 64 IN | BODY MASS INDEX: 30.11 KG/M2 | HEART RATE: 64 BPM | SYSTOLIC BLOOD PRESSURE: 118 MMHG | WEIGHT: 176.38 LBS

## 2024-03-27 DIAGNOSIS — I25.10 CORONARY ARTERY DISEASE INVOLVING NATIVE CORONARY ARTERY OF NATIVE HEART, UNSPECIFIED WHETHER ANGINA PRESENT: ICD-10-CM

## 2024-03-27 DIAGNOSIS — I10 PRIMARY HYPERTENSION: ICD-10-CM

## 2024-03-27 DIAGNOSIS — Z00.00 ENCOUNTER FOR MEDICARE ANNUAL WELLNESS EXAM: ICD-10-CM

## 2024-03-27 DIAGNOSIS — R73.03 PREDIABETES: ICD-10-CM

## 2024-03-27 DIAGNOSIS — Z12.2 SCREENING FOR LUNG CANCER: ICD-10-CM

## 2024-03-27 DIAGNOSIS — E78.49 OTHER HYPERLIPIDEMIA: ICD-10-CM

## 2024-03-27 DIAGNOSIS — J44.9 CHRONIC OBSTRUCTIVE PULMONARY DISEASE, UNSPECIFIED COPD TYPE: Primary | ICD-10-CM

## 2024-03-27 DIAGNOSIS — Z72.0 TOBACCO ABUSE: ICD-10-CM

## 2024-03-27 DIAGNOSIS — I77.9 PERIPHERAL ARTERIAL OCCLUSIVE DISEASE: ICD-10-CM

## 2024-03-27 DIAGNOSIS — Z12.5 ENCOUNTER FOR PROSTATE CANCER SCREENING: ICD-10-CM

## 2024-03-27 DIAGNOSIS — Z87.891 PERSONAL HISTORY OF NICOTINE DEPENDENCE: ICD-10-CM

## 2024-03-27 DIAGNOSIS — I25.2 HISTORY OF MI (MYOCARDIAL INFARCTION): ICD-10-CM

## 2024-03-27 PROBLEM — E66.811 CLASS 1 OBESITY DUE TO EXCESS CALORIES WITH SERIOUS COMORBIDITY AND BODY MASS INDEX (BMI) OF 30.0 TO 30.9 IN ADULT: Status: ACTIVE | Noted: 2023-09-21

## 2024-03-27 PROBLEM — E66.09 CLASS 1 OBESITY DUE TO EXCESS CALORIES WITH SERIOUS COMORBIDITY AND BODY MASS INDEX (BMI) OF 30.0 TO 30.9 IN ADULT: Status: ACTIVE | Noted: 2023-09-21

## 2024-03-27 PROCEDURE — 3078F DIAST BP <80 MM HG: CPT | Mod: ,,, | Performed by: NURSE PRACTITIONER

## 2024-03-27 PROCEDURE — 3288F FALL RISK ASSESSMENT DOCD: CPT | Mod: ,,, | Performed by: NURSE PRACTITIONER

## 2024-03-27 PROCEDURE — 99214 OFFICE O/P EST MOD 30 MIN: CPT | Mod: ,,, | Performed by: NURSE PRACTITIONER

## 2024-03-27 PROCEDURE — 1159F MED LIST DOCD IN RCRD: CPT | Mod: ,,, | Performed by: NURSE PRACTITIONER

## 2024-03-27 PROCEDURE — 1101F PT FALLS ASSESS-DOCD LE1/YR: CPT | Mod: ,,, | Performed by: NURSE PRACTITIONER

## 2024-03-27 PROCEDURE — 3074F SYST BP LT 130 MM HG: CPT | Mod: ,,, | Performed by: NURSE PRACTITIONER

## 2024-03-27 PROCEDURE — 1126F AMNT PAIN NOTED NONE PRSNT: CPT | Mod: ,,, | Performed by: NURSE PRACTITIONER

## 2024-03-27 PROCEDURE — 4010F ACE/ARB THERAPY RXD/TAKEN: CPT | Mod: ,,, | Performed by: NURSE PRACTITIONER

## 2024-03-27 PROCEDURE — 1160F RVW MEDS BY RX/DR IN RCRD: CPT | Mod: ,,, | Performed by: NURSE PRACTITIONER

## 2024-03-27 RX ORDER — CLOPIDOGREL BISULFATE 75 MG/1
75 TABLET ORAL DAILY
Qty: 90 TABLET | Refills: 3 | Status: SHIPPED | OUTPATIENT
Start: 2024-03-27

## 2024-03-27 RX ORDER — EZETIMIBE 10 MG/1
10 TABLET ORAL DAILY
Qty: 90 TABLET | Refills: 3 | Status: SHIPPED | OUTPATIENT
Start: 2024-03-27

## 2024-03-27 RX ORDER — METOPROLOL SUCCINATE 100 MG/1
100 TABLET, EXTENDED RELEASE ORAL DAILY
Qty: 90 TABLET | Refills: 3 | Status: SHIPPED | OUTPATIENT
Start: 2024-03-27

## 2024-03-27 RX ORDER — ISOSORBIDE MONONITRATE 120 MG/1
120 TABLET, EXTENDED RELEASE ORAL DAILY
Qty: 90 TABLET | Refills: 3 | Status: SHIPPED | OUTPATIENT
Start: 2024-03-27

## 2024-03-27 RX ORDER — ALBUTEROL SULFATE 90 UG/1
AEROSOL, METERED RESPIRATORY (INHALATION)
Qty: 54 G | Refills: 3 | Status: SHIPPED | OUTPATIENT
Start: 2024-03-27

## 2024-03-27 RX ORDER — NITROGLYCERIN 0.4 MG/1
0.4 TABLET SUBLINGUAL EVERY 5 MIN PRN
Qty: 25 TABLET | Refills: 2 | Status: SHIPPED | OUTPATIENT
Start: 2024-03-27

## 2024-03-27 RX ORDER — ROSUVASTATIN CALCIUM 40 MG/1
TABLET, COATED ORAL
Qty: 90 TABLET | Refills: 3 | Status: SHIPPED | OUTPATIENT
Start: 2024-03-27

## 2024-03-27 RX ORDER — BENAZEPRIL HYDROCHLORIDE 10 MG/1
10 TABLET ORAL DAILY
Qty: 90 TABLET | Refills: 3 | Status: SHIPPED | OUTPATIENT
Start: 2024-03-27

## 2024-03-27 RX ORDER — ASPIRIN 81 MG/1
81 TABLET ORAL DAILY
Qty: 90 TABLET | Refills: 3 | Status: SHIPPED | OUTPATIENT
Start: 2024-03-27

## 2024-03-27 RX ORDER — ALBUTEROL SULFATE 90 UG/1
AEROSOL, METERED RESPIRATORY (INHALATION)
Qty: 54 G | Refills: 3 | Status: CANCELLED | OUTPATIENT
Start: 2024-03-27

## 2024-03-27 NOTE — ASSESSMENT & PLAN NOTE
Lipid Panel:  Lab Results   Component Value Date    CHOL 151 09/25/2023    HDL 70 (H) 09/25/2023    DLDL 72.7 09/25/2023    TRIG 115 09/25/2023    TOTALCHOLEST 3 03/21/2023    AST 16 03/21/2023    ALT 22 03/21/2023    ALKPHOS 60 03/21/2023    LABPROT 7.0 03/21/2023    ALBUMIN 3.8 03/21/2023    BILIDIR 0.2 01/25/2021    IBILI 0.20 01/25/2021

## 2024-03-27 NOTE — ASSESSMENT & PLAN NOTE
Lab Results   Component Value Date    BUN 9.0 09/06/2023    CREATININE 0.69 09/06/2023    EGFRNORACEVR >90 09/06/2023    K 4.5 09/06/2023

## 2024-03-27 NOTE — PROGRESS NOTES
Patient ID: Siobhan Scott Jr.  : 1950     Chief Complaint: Urinary Frequency    Allergies: Patient has No Known Allergies.     History of Present Illness:  The patient is a 74 y.o. White male who presents to clinic for evaluation and management with a chief complaint of Urinary Frequency   Patient is here to establish care. Patient was seeing PCP in [place]. Needs someone closer now that moved to [place]. Patient is established with Dr. Ramon for cardiology. Has a 3cm AAA that is being monitored. Patient reports colonoscopy by Dr. Sterling, reports 7 benign polyp. Unsure when repeat was recommended. Denies ever taking medication for prediabetes. Patient declines any vaccinations. Patient has chronic decreased vision in left eye due to work injury many years ago.  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     Hypertension  This is a chronic problem. The current episode started more than 1 year ago. The problem is unchanged. The problem is controlled. Pertinent negatives include no anxiety, blurred vision, chest pain, headaches, malaise/fatigue, neck pain, orthopnea, palpitations, peripheral edema, PND, shortness of breath or sweats. Risk factors for coronary artery disease include dyslipidemia, family history, smoking/tobacco exposure and male gender. Past treatments include beta blockers and ACE inhibitors. Hypertensive end-organ damage includes CAD/MI and PVD.        Past Medical History:  has a past medical history of Abdominal aneurysm, Accelerating angina, Angina pectoris, Atherosclerosis of native arteries of extremities with intermittent claudication, right leg, CAD (coronary artery disease), Carotid bruit, Chest pain, Claudication, COPD (chronic obstructive pulmonary disease), Current smoker, Diabetic neuropathy, KELLY (dyspnea on exertion), Elevated glucose, HLD (hyperlipidemia), HTN (hypertension), Obesity, Class I, BMI 30-34.9, Old MI (myocardial infarction), Personal history of  colonic polyps, Snores, SOB (shortness of breath), and Type 2 diabetes mellitus.    Social History:  reports that he has been smoking cigarettes. He started smoking about 40 years ago. He has a 20.3 pack-year smoking history. He has never used smokeless tobacco. He reports that he does not currently use alcohol. He reports that he does not use drugs.    Care Team: Patient Care Team:  Gael Medina APRN as PCP - General (Family Medicine)  Grant Whitehead MD as Consulting Physician (Pulmonary Disease)  Mahamed Whitehead MD (Ophthalmology)  Keegan Sterling MD as Consulting Physician (Gastroenterology)  [facility] as Cardio  Freddie Ramon MD as Consulting Physician (Cardiology)     Current Medications:  Current Outpatient Medications   Medication Instructions    albuterol (PROVENTIL/VENTOLIN HFA) 90 mcg/actuation inhaler INHALE 2 PUFFS BY MOUTH EVERY 4 TO 6 HOURS AS NEEDED    amLODIPine (NORVASC) 5 mg, Oral, Daily    aspirin (ECOTRIN) 81 mg, Oral, Daily    benazepriL (LOTENSIN) 10 mg, Oral, Daily    clopidogreL (PLAVIX) 75 mg, Oral, Daily    ezetimibe (ZETIA) 10 mg, Oral, Daily    fluticasone-umeclidin-vilanter (TRELEGY ELLIPTA) 100-62.5-25 mcg DsDv 1 puff, Oral, Daily    isosorbide mononitrate (IMDUR) 120 mg, Oral, Daily    metoprolol succinate (TOPROL-XL) 100 mg, Oral, Daily    nitroGLYCERIN (NITROSTAT) 0.4 mg, Sublingual, Every 5 min PRN, One tablet every 5mins X3 tablets in 15 minutes as needed for chest pain. If unrelieved after 3 tablets seek emergency care.    ranolazine (RANEXA) 1,000 mg, Oral, 2 times daily    rosuvastatin (CRESTOR) 40 MG Tab SMARTSI Tablet(s) By Mouth Every Evening       Review of Systems   Constitutional:  Negative for chills, fatigue, fever, malaise/fatigue and unexpected weight change.   HENT:  Negative for nasal congestion, postnasal drip, sinus pressure/congestion and sore throat.    Eyes:  Negative for blurred vision, photophobia, visual disturbance  "and eye dryness.   Respiratory:  Positive for cough. Negative for choking and shortness of breath.    Cardiovascular:  Negative for chest pain, palpitations, orthopnea, leg swelling and PND.   Gastrointestinal:  Negative for abdominal pain, blood in stool, constipation, diarrhea and reflux.   Endocrine: Negative for polydipsia, polyphagia and polyuria.   Genitourinary:  Negative for dysuria, flank pain, hematuria, testicular pain and urgency.   Musculoskeletal:  Negative for arthralgias, joint swelling, neck pain and joint deformity.   Integumentary:  Negative for rash and mole/lesion.   Neurological:  Negative for dizziness, vertigo, tremors, seizures, syncope, numbness and headaches.   Psychiatric/Behavioral:  Negative for agitation, behavioral problems, self-injury, sleep disturbance and suicidal ideas. The patient is not nervous/anxious.         Visit Vitals  /68 (BP Location: Right arm)   Pulse 64   Temp 97 °F (36.1 °C)   Ht 5' 3.78" (1.62 m)   Wt 80 kg (176 lb 6.4 oz)   SpO2 97%   BMI 30.49 kg/m²       Physical Exam  Vitals reviewed.   Constitutional:       General: He is not in acute distress.     Appearance: Normal appearance.   HENT:      Head: Normocephalic and atraumatic.      Right Ear: Tympanic membrane, ear canal and external ear normal.      Left Ear: Tympanic membrane, ear canal and external ear normal.      Nose: Nose normal. No congestion.      Mouth/Throat:      Mouth: Mucous membranes are moist.      Pharynx: Oropharynx is clear. No oropharyngeal exudate or posterior oropharyngeal erythema.   Eyes:      Extraocular Movements: Extraocular movements intact.      Conjunctiva/sclera: Conjunctivae normal.   Cardiovascular:      Rate and Rhythm: Normal rate and regular rhythm.      Pulses: Normal pulses.   Pulmonary:      Effort: Pulmonary effort is normal.      Breath sounds: Normal breath sounds.   Abdominal:      General: Bowel sounds are normal.      Palpations: Abdomen is soft.      " Tenderness: There is no abdominal tenderness.   Musculoskeletal:         General: No swelling, tenderness or deformity.      Cervical back: Neck supple.   Lymphadenopathy:      Cervical: No cervical adenopathy.   Skin:     General: Skin is warm and dry.      Capillary Refill: Capillary refill takes less than 2 seconds.      Coloration: Skin is not jaundiced.      Findings: No rash.   Neurological:      Mental Status: He is alert and oriented to person, place, and time.      Cranial Nerves: No cranial nerve deficit.   Psychiatric:         Mood and Affect: Mood normal.         Behavior: Behavior normal.         Thought Content: Thought content normal.         Judgment: Judgment normal.          Labs Reviewed:  Chemistry:  Lab Results   Component Value Date     09/06/2023    K 4.5 09/06/2023    CHLORIDE 101 09/06/2023    BUN 9.0 09/06/2023    CREATININE 0.69 09/06/2023    EGFRNORACEVR >90 09/06/2023    GLUCOSE 149 (H) 09/06/2023    CALCIUM 9.3 09/06/2023    ALKPHOS 60 03/21/2023    LABPROT 7.0 03/21/2023    ALBUMIN 3.8 03/21/2023    BILIDIR 0.2 01/25/2021    IBILI 0.20 01/25/2021    AST 16 03/21/2023    ALT 22 03/21/2023    MG 2.1 01/13/2020    TTIPSBQD80EP 43.8 03/21/2023    TSH 1.429 03/21/2023    PSA 0.53 03/21/2023        Lab Results   Component Value Date    HGBA1C 6.4 03/21/2023        Hematology:  Lab Results   Component Value Date    WBC 8.35 08/16/2023    RBC 5.15 08/16/2023    HGB 17.2 08/16/2023    HCT 49.0 08/16/2023    MCV 95.1 08/16/2023    MCH 33.4 08/16/2023    MCHC 35.1 08/16/2023    RDW 12.8 08/16/2023     08/16/2023    MPV 9.7 08/16/2023       Lipid Panel:  Lab Results   Component Value Date    CHOL 151 09/25/2023    HDL 70 (H) 09/25/2023    DLDL 72.7 09/25/2023    TRIG 115 09/25/2023    TOTALCHOLEST 3 03/21/2023        Assessment & Plan:  1. Chronic obstructive pulmonary disease, unspecified COPD type  Overview:  Established with Dr. Whitehead, taking veronica and prn  albuterol    Orders:  -     fluticasone-umeclidin-vilanter (TRELEGY ELLIPTA) 100-62.5-25 mcg DsDv; Take 1 puff by mouth once daily.  Dispense: 180 each; Refill: 3  -     albuterol (PROVENTIL/VENTOLIN HFA) 90 mcg/actuation inhaler; INHALE 2 PUFFS BY MOUTH EVERY 4 TO 6 HOURS AS NEEDED  Dispense: 54 g; Refill: 3    2. Other hyperlipidemia  Overview:  Taking rosuvastatin 40 and zetia    Assessment & Plan:  Lipid Panel:  Lab Results   Component Value Date    CHOL 151 2023    HDL 70 (H) 2023    DLDL 72.7 2023    TRIG 115 2023    TOTALCHOLEST 3 2023    AST 16 2023    ALT 22 2023    ALKPHOS 60 2023    LABPROT 7.0 2023    ALBUMIN 3.8 2023    BILIDIR 0.2 2021    IBILI 0.20 2021          Orders:  -     ezetimibe (ZETIA) 10 mg tablet; Take 1 tablet (10 mg total) by mouth once daily.  Dispense: 90 tablet; Refill: 3  -     rosuvastatin (CRESTOR) 40 MG Tab; SMARTSI Tablet(s) By Mouth Every Evening  Dispense: 90 tablet; Refill: 3  -     CBC Auto Differential; Future; Expected date: 2024  -     Comprehensive Metabolic Panel; Future; Expected date: 2024  -     Lipid Panel; Future; Expected date: 2024    3. Primary hypertension  Overview:  Taking metoprolol 100mg and benazepril 10mg     Assessment & Plan:  Lab Results   Component Value Date    BUN 9.0 2023    CREATININE 0.69 2023    EGFRNORACEVR >90 2023    K 4.5 2023         Orders:  -     benazepriL (LOTENSIN) 10 MG tablet; Take 1 tablet (10 mg total) by mouth once daily.  Dispense: 90 tablet; Refill: 3  -     metoprolol succinate (TOPROL-XL) 100 MG 24 hr tablet; Take 1 tablet (100 mg total) by mouth once daily.  Dispense: 90 tablet; Refill: 3  -     CBC Auto Differential; Future; Expected date: 2024  -     Comprehensive Metabolic Panel; Future; Expected date: 2024  -     TSH; Future; Expected date: 2024    4. Coronary artery disease involving native  coronary artery of native heart, unspecified whether angina present  Overview:  Established with Dr. Ramon, taking crestor 40, imdur, zetia, plavix and aspirin    Assessment & Plan:  Hematology:  Lab Results   Component Value Date    HGB 17.2 08/16/2023    HCT 49.0 08/16/2023     08/16/2023         Orders:  -     aspirin (ECOTRIN) 81 MG EC tablet; Take 1 tablet (81 mg total) by mouth once daily.  Dispense: 90 tablet; Refill: 3  -     clopidogreL (PLAVIX) 75 mg tablet; Take 1 tablet (75 mg total) by mouth once daily.  Dispense: 90 tablet; Refill: 3  -     isosorbide mononitrate (IMDUR) 120 MG 24 hr tablet; Take 1 tablet (120 mg total) by mouth once daily.  Dispense: 90 tablet; Refill: 3  -     nitroGLYCERIN (NITROSTAT) 0.4 MG SL tablet; Place 1 tablet (0.4 mg total) under the tongue every 5 (five) minutes as needed for Chest pain. One tablet every 5mins X3 tablets in 15 minutes as needed for chest pain. If unrelieved after 3 tablets seek emergency care.  Dispense: 25 tablet; Refill: 2    5. Prediabetes  Overview:  Due for labs    Assessment & Plan:  Diabetes labs:   Lab Results   Component Value Date    HGBA1C 6.4 03/21/2023          Orders:  -     CBC Auto Differential; Future; Expected date: 03/27/2024  -     Comprehensive Metabolic Panel; Future; Expected date: 03/27/2024  -     Hemoglobin A1C; Future; Expected date: 03/27/2024    6. Peripheral arterial occlusive disease  Overview:  Established with [facility], taking crestor, ranexa and zetia.          Future Appointments   Date Time Provider Department Center   5/27/2024  8:50 AM LAB, [facility] LABORATORY D[facility] RIANNA LAMINE [place]   5/31/2024  9:00 AM [facility]       Follow up for request records Dr. Sterling (colonoscopy), Dr. Ramon, Dr. Whitehead. Call sooner if needed.    LORENZA PULIDO       Lab Frequency Next Occurrence   CBC Auto Differential Once 03/27/2024   Comprehensive Metabolic Panel Once 03/27/2024   Lipid  Panel Once 03/27/2024   TSH Once 03/27/2024   Hemoglobin A1C Once 03/27/2024   PSA, Screening Once 03/27/2024   CT Chest Lung Screening Low Dose Once 03/27/2024

## 2024-03-27 NOTE — ASSESSMENT & PLAN NOTE
Hematology:  Lab Results   Component Value Date    HGB 17.2 08/16/2023    HCT 49.0 08/16/2023     08/16/2023

## 2024-05-08 ENCOUNTER — HOSPITAL ENCOUNTER (OUTPATIENT)
Dept: RADIOLOGY | Facility: HOSPITAL | Age: 74
Discharge: HOME | End: 2024-05-08
Attending: NURSE PRACTITIONER
Payer: MEDICARE

## 2024-05-08 ENCOUNTER — TELEPHONE (OUTPATIENT)
Dept: FAMILY MEDICINE | Facility: CLINIC | Age: 74
End: 2024-05-08
Payer: MEDICARE

## 2024-05-08 DIAGNOSIS — Z87.891 PERSONAL HISTORY OF NICOTINE DEPENDENCE: ICD-10-CM

## 2024-05-08 PROCEDURE — 71271 CT THORAX LUNG CANCER SCR C-: CPT | Mod: TC

## 2024-05-08 NOTE — TELEPHONE ENCOUNTER
----- Message from LORENZA Montoya sent at 5/8/2024 11:56 AM CDT -----  Please notify patient of following results:     Ct does not show any sign of lung cancer. Only shows signs of COPD. Continue annual screening

## 2024-05-28 PROCEDURE — 83036 HEMOGLOBIN GLYCOSYLATED A1C: CPT | Performed by: NURSE PRACTITIONER

## 2024-05-28 PROCEDURE — 80061 LIPID PANEL: CPT | Performed by: NURSE PRACTITIONER

## 2024-05-28 PROCEDURE — 84443 ASSAY THYROID STIM HORMONE: CPT | Performed by: NURSE PRACTITIONER

## 2024-05-28 PROCEDURE — 85025 COMPLETE CBC W/AUTO DIFF WBC: CPT | Performed by: NURSE PRACTITIONER

## 2024-05-28 PROCEDURE — 84153 ASSAY OF PSA TOTAL: CPT | Performed by: NURSE PRACTITIONER

## 2024-05-28 PROCEDURE — 80053 COMPREHEN METABOLIC PANEL: CPT | Performed by: NURSE PRACTITIONER

## 2024-05-31 ENCOUNTER — OFFICE VISIT (OUTPATIENT)
Dept: FAMILY MEDICINE | Facility: CLINIC | Age: 74
End: 2024-05-31
Payer: MEDICARE

## 2024-05-31 VITALS
HEIGHT: 64 IN | BODY MASS INDEX: 30.22 KG/M2 | HEART RATE: 93 BPM | OXYGEN SATURATION: 94 % | TEMPERATURE: 98 F | SYSTOLIC BLOOD PRESSURE: 110 MMHG | WEIGHT: 177 LBS | DIASTOLIC BLOOD PRESSURE: 62 MMHG

## 2024-05-31 DIAGNOSIS — J42 CHRONIC BRONCHITIS, UNSPECIFIED CHRONIC BRONCHITIS TYPE: ICD-10-CM

## 2024-05-31 DIAGNOSIS — I77.9 PERIPHERAL ARTERIAL OCCLUSIVE DISEASE: ICD-10-CM

## 2024-05-31 DIAGNOSIS — Z00.00 MEDICARE ANNUAL WELLNESS VISIT, SUBSEQUENT: Primary | ICD-10-CM

## 2024-05-31 DIAGNOSIS — E78.49 OTHER HYPERLIPIDEMIA: ICD-10-CM

## 2024-05-31 DIAGNOSIS — E11.9 CONTROLLED TYPE 2 DIABETES MELLITUS WITHOUT COMPLICATION, WITHOUT LONG-TERM CURRENT USE OF INSULIN: ICD-10-CM

## 2024-05-31 DIAGNOSIS — Z72.0 TOBACCO ABUSE: ICD-10-CM

## 2024-05-31 DIAGNOSIS — I25.2 HISTORY OF MI (MYOCARDIAL INFARCTION): ICD-10-CM

## 2024-05-31 DIAGNOSIS — I25.118 CORONARY ARTERY DISEASE OF NATIVE ARTERY OF NATIVE HEART WITH STABLE ANGINA PECTORIS: ICD-10-CM

## 2024-05-31 DIAGNOSIS — Z79.51 LONG TERM (CURRENT) USE OF INHALED STEROIDS: ICD-10-CM

## 2024-05-31 DIAGNOSIS — I10 PRIMARY HYPERTENSION: ICD-10-CM

## 2024-05-31 DIAGNOSIS — Z13.820 SCREENING FOR OSTEOPOROSIS: ICD-10-CM

## 2024-05-31 DIAGNOSIS — E66.09 CLASS 1 OBESITY DUE TO EXCESS CALORIES WITH SERIOUS COMORBIDITY AND BODY MASS INDEX (BMI) OF 30.0 TO 30.9 IN ADULT: ICD-10-CM

## 2024-05-31 PROCEDURE — 1160F RVW MEDS BY RX/DR IN RCRD: CPT | Mod: ,,, | Performed by: NURSE PRACTITIONER

## 2024-05-31 PROCEDURE — 4010F ACE/ARB THERAPY RXD/TAKEN: CPT | Mod: ,,, | Performed by: NURSE PRACTITIONER

## 2024-05-31 PROCEDURE — 3074F SYST BP LT 130 MM HG: CPT | Mod: ,,, | Performed by: NURSE PRACTITIONER

## 2024-05-31 PROCEDURE — 1159F MED LIST DOCD IN RCRD: CPT | Mod: ,,, | Performed by: NURSE PRACTITIONER

## 2024-05-31 PROCEDURE — G0439 PPPS, SUBSEQ VISIT: HCPCS | Mod: ,,, | Performed by: NURSE PRACTITIONER

## 2024-05-31 PROCEDURE — 1126F AMNT PAIN NOTED NONE PRSNT: CPT | Mod: ,,, | Performed by: NURSE PRACTITIONER

## 2024-05-31 PROCEDURE — 3044F HG A1C LEVEL LT 7.0%: CPT | Mod: ,,, | Performed by: NURSE PRACTITIONER

## 2024-05-31 PROCEDURE — 3288F FALL RISK ASSESSMENT DOCD: CPT | Mod: ,,, | Performed by: NURSE PRACTITIONER

## 2024-05-31 PROCEDURE — 1101F PT FALLS ASSESS-DOCD LE1/YR: CPT | Mod: ,,, | Performed by: NURSE PRACTITIONER

## 2024-05-31 PROCEDURE — 99497 ADVNCD CARE PLAN 30 MIN: CPT | Mod: ,,, | Performed by: NURSE PRACTITIONER

## 2024-05-31 PROCEDURE — 3078F DIAST BP <80 MM HG: CPT | Mod: ,,, | Performed by: NURSE PRACTITIONER

## 2024-05-31 NOTE — ASSESSMENT & PLAN NOTE
Discussed treatment options, will begin dietary modifications, hand outs provided. Consider farxiga in the future

## 2024-05-31 NOTE — ASSESSMENT & PLAN NOTE
Lipid Panel:  Lab Results   Component Value Date    CHOL 128 05/28/2024    HDL 67 (H) 05/28/2024    DLDL 54.4 05/28/2024    TRIG 103 05/28/2024    TOTALCHOLEST 3 03/21/2023    AST 29 05/28/2024    ALT 31 05/28/2024    ALKPHOS 75 05/28/2024    LABPROT 6.8 05/28/2024    ALBUMIN 4.1 05/28/2024    BILIDIR 0.2 01/25/2021    IBILI 0.20 01/25/2021

## 2024-05-31 NOTE — ASSESSMENT & PLAN NOTE
Lab Results   Component Value Date    BUN 11 05/28/2024    CREATININE 0.71 05/28/2024    EGFRNORACEVR >90 05/28/2024    K 4.1 05/28/2024

## 2024-05-31 NOTE — PROGRESS NOTES
Patient ID: Siobhan Scott Jr.  : 1950    Chief Complaint: Medicare AWV      History of Present Illness:  The patient is a 74 y.o. White male who presents to clinic for annual wellness visit.    Patient is here to establish care. Patient was seeing PCP in [place]. Needs someone closer now that moved to [place]. Patient is established with Dr. Ramon for cardiology. Has a 3cm AAA that is being monitored. Patient reports colonoscopy by Dr. Sterling, reports 7 benign polyp in 2020- repeat 5 years. Denies ever taking medication for prediabetes. Patient declines any vaccinations. Patient has chronic decreased vision in left eye due to work injury many years ago.  Patient retired from [employer] after heart attack in .     Diet and nutrition:  Diet is high in salt, high in fat, low in fiber, high caloric intake, high carbohydrate meals, high calcium intake.    Fracture risk: No history of fracture, no recent unexplained fracture.    Physical activity:  Does not exercise on a regular basis, good physical condition.    Depression risks:  No history of depression, never feel sad, empty, or tearful, no sleep disturbances, no agitation, no loss of energy, no feelings of worthlessness or guilt, no thoughts of suicide.    Orientation:  No disorientation to time, no disorientation to place.    Concentration and memory:  No decreased concentration ability, no memory lapses or loss, does not forget words.    Speech forward/motor difficulties: No speech difficulties, no difficulty expressing formulated concepts, no difficulty with fine manipulative tasks, no difficulty writing forward/copying, no slowed reaction time, does not knock things over when trying to pick them up.    Fall risk assessment:  No frequent falls while walking, no fall in the past year, no dizziness forward/vertigo, no fear of falling.  Hearing:  No loss of hearing, does not wear hearing aids.    Vision:  No vision problems, does wear glasses.     Activity of daily living: Able to bathe with limited or no assistance, able to control urination and bowels, able to dress with limited or no assistance, able to feed self with limited or no assistance, able to get out of chair or bed with limited or no assistance, able to [place] with limited or no assistance, able to toilet with limited are no assistance.    Activities of daily living:  Able to do housework with limited or no assistance, able to grocery shop with limited or no assistance, able to manage medications with limited or no assistance, able to manage money with limited or no assistance, able to prepare meals with limited or no assistance, able to use the phone with limited or no assistance.    Screenings: due for vaccinations, not due for colorectal cancer screening.        Hypertension  This is a chronic problem. The current episode started more than 1 year ago. The problem is unchanged. The problem is controlled. Pertinent negatives include no anxiety, blurred vision, chest pain, headaches, malaise/fatigue, neck pain, orthopnea, palpitations, peripheral edema, PND, shortness of breath or sweats. Risk factors for coronary artery disease include dyslipidemia, family history, smoking/tobacco exposure and male gender. Past treatments include beta blockers and ACE inhibitors. Hypertensive end-organ damage includes CAD/MI and PVD.        Allergies: Patient has No Known Allergies.     Past Medical History:  has a past medical history of Abdominal aneurysm, Accelerating angina, Angina pectoris, Atherosclerosis of native arteries of extremities with intermittent claudication, right leg, CAD (coronary artery disease), Carotid bruit, Chest pain, Claudication, COPD (chronic obstructive pulmonary disease), Current smoker, Diabetic neuropathy, KELLY (dyspnea on exertion), Elevated glucose, HLD (hyperlipidemia), HTN (hypertension), Obesity, Class I, BMI 30-34.9, Old MI (myocardial infarction), Personal history of  colonic polyps, Snores, SOB (shortness of breath), and Type 2 diabetes mellitus.    Surgical History:  has a past surgical history that includes Appendectomy; Cataract extraction; Retinal detachment surgery; Tonsillectomy; Colonoscopy w/ biopsies (11/26/2014); Cardiac catheterization (Left, 01/17/2020); Cardiac catheterization (Left, 02/14/2012); Colonoscopy w/ biopsies and polypectomy (08/13/2020); Coronary angiography (Right, 09/05/2023); instantaneous wave-free ratio (ifr) (N/A, 09/05/2023); ivus, coronary (09/05/2023); Coronary stent placement (N/A, 09/05/2023); Left heart catheterization (Left, 09/05/2023); and Ultrasonic lithotripsy (09/05/2023).    Family History: family history includes Heart attack in his father; Hyperlipidemia in his brother, father, and mother; Hypertension in his brother, mother, and sister; Lung cancer in his sister; Peripheral vascular disease in his father; Rheum arthritis in his mother.    Social History:  reports that he has been smoking cigarettes and vaping with nicotine. He started smoking about 40 years ago. He has a 20.4 pack-year smoking history. He has been exposed to tobacco smoke. He has never used smokeless tobacco. He reports that he does not currently use alcohol. He reports that he does not use drugs.    Care Team: Patient Care Team:  Gael Medina APRN as PCP - General (Family Medicine)  Grant Whitehead MD as Consulting Physician (Pulmonary Disease)  Mahamed Whitehead MD (Ophthalmology)  Keegan Sterling MD as Consulting Physician (Gastroenterology)  [facility] as Cardio  Freddie Ramon MD as Consulting Physician (Cardiology)  [facility] (Optometry)     Current Medications:  Current Outpatient Medications   Medication Instructions    albuterol (PROVENTIL/VENTOLIN HFA) 90 mcg/actuation inhaler INHALE 2 PUFFS BY MOUTH EVERY 4 TO 6 HOURS AS NEEDED    amLODIPine (NORVASC) 5 mg, Oral, Daily    aspirin (ECOTRIN) 81 mg, Oral,  Daily    benazepriL (LOTENSIN) 10 mg, Oral, Daily    clopidogreL (PLAVIX) 75 mg, Oral, Daily    ezetimibe (ZETIA) 10 mg, Oral, Daily    fluticasone-umeclidin-vilanter (TRELEGY ELLIPTA) 100-62.5-25 mcg DsDv 1 puff, Oral, Daily    isosorbide mononitrate (IMDUR) 120 mg, Oral, Daily    metoprolol succinate (TOPROL-XL) 100 mg, Oral, Daily    nitroGLYCERIN (NITROSTAT) 0.4 mg, Sublingual, Every 5 min PRN, One tablet every 5mins X3 tablets in 15 minutes as needed for chest pain. If unrelieved after 3 tablets seek emergency care.    ranolazine (RANEXA) 1,000 mg, Oral, 2 times daily    rosuvastatin (CRESTOR) 40 MG Tab SMARTSI Tablet(s) By Mouth Every Evening       Opioid Screening: Patient medication list reviewed, patient is not taking prescription opioids. Patient is not using additional opioids than prescribed. Patient is at low risk of substance abuse based on this opioid use history.     Patient Reported Health Risk Assessment  What [age]  Are you male or female?: Male  During the past four weeks, how much have you been bothered by emotional problems such as feeling anxious, depressed, irritable, sad, or downhearted and blue?: Not at all  During the past five weeks, has your physical and/or emotional health limited your social activities with family, friends, neighbors, or groups?: Not at all  During the past four weeks, how much bodily pain have you generally had?: Mild pain  During the past four weeks, was someone available to help if you needed and wanted help?: Yes, as much as I wanted  During the past four weeks, what was the hardest physical activity you could do for at least two minutes?: Moderate  Can you get to places out of walking distance without help?  (For example, can you travel alone on buses or taxis, or drive your own car?): Yes  Can you go shopping for groceries or clothes without someone's help?: Yes  Can you prepare your own meals?: Yes  Can you do your own housework without help?:  Yes  Because of any health problems, do you need the help of another person with your personal care needs such as eating, bathing, dressing, or getting around the house?: No  Can you handle your own money without help?: Yes  During the past four weeks, how would you rate your health in general?: Excellent  How have things been going for you during the past four weeks?: Pretty well  Are you having difficulties driving your car?: No  Do you always fasten your seat belt when you are in a car?: Yes, usually  How often in the past four weeks have you been bothered by falling or dizzy when standing up?: Never  How often in the past four weeks have you been bothered by sexual problems?: Seldom  How often in the past four weeks have you been bothered by trouble eating well?: Never  How often in the past four weeks have you been bothered by teeth or denture problems?: Sometimes  How often in the past four weeks have you been bothered with problems using the telephone?: Never  How often in the past four weeks have you been bothered by tiredness or fatigue?: Never  Have you fallen two or more times in the past year?: No  Are you afraid of falling?: Yes  Are you a smoker?: Yes, I'm not ready to quit  During the past four weeks, how many drinks of wine, beer, or other alcoholic beverages did you have?: 10 or more drinks per week  Do you exercise for about 20 minutes three or more days a week?: Yes, some of the time  Have you been given any information to help you with hazards in your house that might hurt you?: Yes  Have you been given any information to help you with keeping track of your medications?: Yes  How often do you have trouble taking medicines the way you've been told to take them?: I always take them as prescribed  How confident are you that you can control and manage most of your health problems?: Very confident  What is your race? (Check all that apply.):     Review of Systems   Constitutional:  Negative  "for chills, fatigue, fever, malaise/fatigue and unexpected weight change.   HENT:  Negative for nasal congestion, postnasal drip, sinus pressure/congestion and sore throat.    Eyes:  Negative for blurred vision, photophobia, visual disturbance and eye dryness.   Respiratory:  Positive for cough. Negative for choking and shortness of breath.    Cardiovascular:  Negative for chest pain, palpitations, orthopnea, leg swelling and PND.   Gastrointestinal:  Negative for abdominal pain, blood in stool, constipation, diarrhea and reflux.   Endocrine: Negative for polydipsia, polyphagia and polyuria.   Genitourinary:  Negative for dysuria, flank pain, hematuria, testicular pain and urgency.   Musculoskeletal:  Negative for arthralgias, joint swelling, neck pain and joint deformity.   Integumentary:  Negative for rash and mole/lesion.   Neurological:  Negative for dizziness, vertigo, tremors, seizures, syncope, numbness and headaches.   Psychiatric/Behavioral:  Negative for agitation, behavioral problems, self-injury, sleep disturbance and suicidal ideas. The patient is not nervous/anxious.         Visit Vitals  /62 (BP Location: Right arm, Patient Position: Sitting)   Pulse 93   Temp 98.1 °F (36.7 °C) (Temporal)   Ht 5' 3.78" (1.62 m)   Wt 80.3 kg (177 lb)   SpO2 (!) 94%   BMI 30.59 kg/m²       Physical Exam  Vitals reviewed.   Constitutional:       General: He is not in acute distress.     Appearance: Normal appearance.   HENT:      Head: Normocephalic and atraumatic.      Right Ear: Tympanic membrane, ear canal and external ear normal.      Left Ear: Tympanic membrane, ear canal and external ear normal.      Nose: Nose normal. No congestion.      Mouth/Throat:      Mouth: Mucous membranes are moist.      Pharynx: Oropharynx is clear. No oropharyngeal exudate or posterior oropharyngeal erythema.   Eyes:      Extraocular Movements: Extraocular movements intact.      Conjunctiva/sclera: Conjunctivae normal. "   Cardiovascular:      Rate and Rhythm: Normal rate and regular rhythm.      Pulses: Normal pulses.   Pulmonary:      Effort: Pulmonary effort is normal.      Breath sounds: Normal breath sounds.   Abdominal:      General: Bowel sounds are normal.      Palpations: Abdomen is soft.      Tenderness: There is no abdominal tenderness.   Musculoskeletal:         General: No swelling, tenderness or deformity.      Cervical back: Neck supple.   Lymphadenopathy:      Cervical: No cervical adenopathy.   Skin:     General: Skin is warm and dry.      Capillary Refill: Capillary refill takes less than 2 seconds.      Coloration: Skin is not jaundiced.      Findings: No rash.   Neurological:      Mental Status: He is alert and oriented to person, place, and time.      Cranial Nerves: No cranial nerve deficit.   Psychiatric:         Mood and Affect: Mood normal.         Behavior: Behavior normal.         Thought Content: Thought content normal.         Judgment: Judgment normal.               No data to display                  5/31/2024     9:00 AM 3/27/2024     8:00 AM 3/21/2023    10:20 AM 8/10/2022     1:40 PM   Fall Risk Assessment - Outpatient   Mobility Status Ambulatory Ambulatory Ambulatory Ambulatory   Number of falls 0 0 0 0   Identified as fall risk False False False False           Depression Screening  Over the past two weeks, has the patient felt down, depressed, or hopeless?: No  Over the past two weeks, has the patient felt little interest or pleasure in doing things?: No  Functional Ability/Safety Screening  Was the patient's timed Up & Go test unsteady or longer than 30 seconds?: No  Does the patient need help with phone, transportation, shopping, preparing meals, housework, laundry, meds, or managing money?: No  Does the patient's home have rugs in the hallway, lack grab bars in the bathroom, lack handrails on the stairs or have poor lighting?: No  Have you noticed any hearing difficulties?: Yes  Cognitive  Function (Assessed through direct observation with due consideration of information obtained by way of patient reports and/or concerns raised by family, friends, caretakers, or others)    Does the patient repeat questions/statements in the same day?: No  Does the patient have trouble remembering the date, year, and time?: No  Does the patient have difficulty managing finances?: No  Does the patient have a decreased sense of direction?: No    Labs Reviewed:  Chemistry:  Lab Results   Component Value Date     (L) 05/28/2024    K 4.1 05/28/2024    CHLORIDE 103 05/28/2024    BUN 11 05/28/2024    CREATININE 0.71 05/28/2024    EGFRNORACEVR >90 05/28/2024    GLUCOSE 151 (H) 05/28/2024    CALCIUM 9.1 05/28/2024    ALKPHOS 75 05/28/2024    LABPROT 6.8 05/28/2024    ALBUMIN 4.1 05/28/2024    BILIDIR 0.2 01/25/2021    IBILI 0.20 01/25/2021    AST 29 05/28/2024    ALT 31 05/28/2024    MG 2.1 01/13/2020    [id number] 43.8 03/21/2023    TSH 1.010 05/28/2024    PSA 0.65 05/28/2024        Lab Results   Component Value Date    HGBA1C 6.7 (H) 05/28/2024        Hematology:  Lab Results   Component Value Date    WBC 6.88 05/28/2024    RBC 4.25 05/28/2024    HGB 14.3 05/28/2024    HCT 39.6 05/28/2024    MCV 93.2 05/28/2024    MCH 33.6 05/28/2024    MCHC 36.1 05/28/2024    RDW 12.4 05/28/2024     05/28/2024    MPV 9.7 05/28/2024       Lipid Panel:  Lab Results   Component Value Date    CHOL 128 05/28/2024    HDL 67 (H) 05/28/2024    DLDL 54.4 05/28/2024    TRIG 103 05/28/2024    TOTALCHOLEST 3 03/21/2023        Assessment & Plan:  1. Medicare annual wellness visit, subsequent  Overview:  Osteoporosis Screening- ordered CT bone density  Colon Cancer Screening -  8/13/20 colonscopy, repeat 5 years  Eye Exam- Recommend annually.  [facility]  Dental Exam- Recommend biannually.  Lung Cancer-05/08/2024 CT lung negative  Out of pneumonia vaccinations in office.  Advised patient to receive  vaccinations at local pharmacy.  Patient states understanding.        2. Chronic bronchitis, unspecified chronic bronchitis type  Overview:  Established with Dr. Whitehead, taking trelegy and prn albuterol      3. Class 1 obesity due to excess calories with serious comorbidity and body mass index (BMI) of 30.0 to 30.9 in adult  Assessment & Plan:  This is a chronic and a comorbid condition that affected my decision making today. Patient educated on importance of diet and exercise.  Weight loss goals discussed. Recommended 30-45 minutes of exercise five days a week.  In addition, counseled patient on importance of low fat diet, limiting carbohydrate intake, and increasing protein and vegetable intake. Hand outs provided. All questions answered.         4. Coronary artery disease of native artery of native heart with stable angina pectoris  Overview:  Established with Dr. Ramon, taking crestor 40, imdur, zetia, plavix and aspirin      5. History of MI (myocardial infarction)  Overview:  1999      6. Primary hypertension  Overview:  Stable with metoprolol 100mg and benazepril 10mg     Assessment & Plan:  Lab Results   Component Value Date    BUN 11 05/28/2024    CREATININE 0.71 05/28/2024    EGFRNORACEVR >90 05/28/2024    K 4.1 05/28/2024         7. Other hyperlipidemia  Overview:  Taking rosuvastatin 40 and zetia    Assessment & Plan:  Lipid Panel:  Lab Results   Component Value Date    CHOL 128 05/28/2024    HDL 67 (H) 05/28/2024    DLDL 54.4 05/28/2024    TRIG 103 05/28/2024    TOTALCHOLEST 3 03/21/2023    AST 29 05/28/2024    ALT 31 05/28/2024    ALKPHOS 75 05/28/2024    LABPROT 6.8 05/28/2024    ALBUMIN 4.1 05/28/2024    BILIDIR 0.2 01/25/2021    IBILI 0.20 01/25/2021          8. Peripheral arterial occlusive disease  Overview:  Established with [facility], taking crestor, ranexa and zetia.       9. Controlled type 2 diabetes mellitus without complication, without long-term current use of insulin  Overview:  New  diagnosis 5/28/24 hba1c 6.7    Assessment & Plan:  Discussed treatment options, will begin dietary modifications, hand outs provided. Consider farxiga in the future      10. Tobacco abuse  Assessment & Plan:  Declines smoking cessation       11. Screening for osteoporosis  -     CT Bone Density Study 1 + Sites Axial; Future; Expected date: 05/31/2024    12. Long term (current) use of inhaled steroids  -     CT Bone Density Study 1 + Sites Axial; Future; Expected date: 05/31/2024         Vaccinations:  Immunization History   Administered Date(s) Administered    Influenza - Quadrivalent 10/17/2005    Influenza - Quadrivalent - PF *Preferred* (6 months and older) 10/17/2005    Influenza - Trivalent (ADULT) 10/17/2005       No future appointments.    Follow up for 1) print pt education 2)6 mo f/u DM fasting labs prior 3)1 yr, Medicare Wellness, Fasting labs prior. Call sooner if needed.    LORENZA PULIDO    Lab Frequency Next Occurrence   CT Bone Density Study 1 + Sites Axial Once 05/31/2024        Medicare Annual Wellness and Personalized Prevention Plan:   Fall Risk + Home Safety + Hearing Impairment + Depression Screen + Opioid and Substance Abuse Screening + Cognitive Impairment Screen + Health Risk Assessment all reviewed.     Health Maintenance Topics with due status: Not Due       Topic Last Completion Date    Influenza Vaccine 10/17/2005    Colorectal Cancer Screening 08/13/2020    LDCT Lung Screen 05/08/2024    Hemoglobin A1c (Prediabetes) 05/28/2024    Lipid Panel 05/28/2024    High Dose Statin 05/31/2024      The patient's Health Maintenance was reviewed and the following appears to be due at this time:   Health Maintenance Due   Topic Date Due    Pneumococcal Vaccines (Age 65+) (1 of 2 - PCV) Never done    TETANUS VACCINE  Never done    Shingles Vaccine (1 of 2) Never done    RSV Vaccine (Age 60+ and Pregnant patients) (1 - 1-dose 60+ series) Never done    Abdominal Aortic Aneurysm Screening  Never done     COVID-19 Vaccine (1 - 2023-24 season) Never done       Advance Care Planning     Date: 05/31/2024  I attest to discussing Advance Care Planning with patient and/or family member.  Education was provided including the importance of the Health Care Power of , Advance Directives, and/or [place] documentation.  The patient expressed understanding to the importance of this information and discussion.       Follow up for 1) print pt education 2)6 mo f/u DM fasting labs prior 3)1 yr, Medicare Wellness, Fasting labs prior. In addition to their scheduled follow up, the patient has also been instructed to follow up on as needed basis.

## 2024-06-03 ENCOUNTER — PATIENT OUTREACH (OUTPATIENT)
Facility: CLINIC | Age: 74
End: 2024-06-03
Payer: MEDICARE

## 2024-06-03 NOTE — PROGRESS NOTES
Health Maintenance Topic(s) Outreach Outcomes & Actions Taken:         Additional Notes:  Hyperlinked AAA Screening from Dr. Ramon's office.

## 2024-08-07 DIAGNOSIS — J44.9 CHRONIC OBSTRUCTIVE PULMONARY DISEASE, UNSPECIFIED COPD TYPE: ICD-10-CM

## 2024-08-07 RX ORDER — FLUTICASONE FUROATE, UMECLIDINIUM BROMIDE AND VILANTEROL TRIFENATATE 100; 62.5; 25 UG/1; UG/1; UG/1
POWDER RESPIRATORY (INHALATION)
Qty: 1 EACH | Refills: 3 | Status: SHIPPED | OUTPATIENT
Start: 2024-08-07

## 2024-09-02 PROBLEM — Z00.00 MEDICARE ANNUAL WELLNESS VISIT, SUBSEQUENT: Status: RESOLVED | Noted: 2023-03-21 | Resolved: 2024-09-02

## 2024-09-26 ENCOUNTER — CLINICAL SUPPORT (OUTPATIENT)
Dept: FAMILY MEDICINE | Facility: CLINIC | Age: 74
End: 2024-09-26
Payer: MEDICARE

## 2024-09-26 DIAGNOSIS — Z23 IMMUNIZATION DUE: Primary | ICD-10-CM

## 2024-10-20 DIAGNOSIS — J44.9 CHRONIC OBSTRUCTIVE PULMONARY DISEASE, UNSPECIFIED COPD TYPE: ICD-10-CM

## 2024-10-21 RX ORDER — ALBUTEROL SULFATE 90 UG/1
INHALANT RESPIRATORY (INHALATION)
Qty: 18 G | Refills: 3 | Status: SHIPPED | OUTPATIENT
Start: 2024-10-21

## 2024-11-14 ENCOUNTER — TELEPHONE (OUTPATIENT)
Dept: FAMILY MEDICINE | Facility: CLINIC | Age: 74
End: 2024-11-14
Payer: MEDICARE

## 2024-11-14 DIAGNOSIS — J44.9 CHRONIC OBSTRUCTIVE PULMONARY DISEASE, UNSPECIFIED COPD TYPE: ICD-10-CM

## 2024-11-14 RX ORDER — ALBUTEROL SULFATE 90 UG/1
2 INHALANT RESPIRATORY (INHALATION) EVERY 4 HOURS PRN
Qty: 18 G | Refills: 0 | Status: SHIPPED | OUTPATIENT
Start: 2024-11-14

## 2024-11-14 RX ORDER — ALBUTEROL SULFATE 90 UG/1
INHALANT RESPIRATORY (INHALATION)
Qty: 18 G | Refills: 0 | Status: SHIPPED | OUTPATIENT
Start: 2024-11-14 | End: 2024-11-14

## 2024-11-14 RX ORDER — ALBUTEROL SULFATE 90 UG/1
INHALANT RESPIRATORY (INHALATION)
Qty: 18 G | Refills: 0 | Status: SHIPPED | OUTPATIENT
Start: 2024-11-14 | End: 2024-11-14 | Stop reason: SDUPTHER

## 2024-11-14 RX ORDER — FLUTICASONE FUROATE, UMECLIDINIUM BROMIDE AND VILANTEROL TRIFENATATE 100; 62.5; 25 UG/1; UG/1; UG/1
POWDER RESPIRATORY (INHALATION)
Qty: 60 EACH | Refills: 3 | Status: SHIPPED | OUTPATIENT
Start: 2024-11-14

## 2024-11-25 ENCOUNTER — TELEPHONE (OUTPATIENT)
Dept: FAMILY MEDICINE | Facility: CLINIC | Age: 74
End: 2024-11-25
Payer: MEDICARE

## 2024-12-10 DIAGNOSIS — J44.9 CHRONIC OBSTRUCTIVE PULMONARY DISEASE, UNSPECIFIED COPD TYPE: ICD-10-CM

## 2024-12-11 ENCOUNTER — TELEPHONE (OUTPATIENT)
Dept: FAMILY MEDICINE | Facility: CLINIC | Age: 74
End: 2024-12-11
Payer: MEDICARE

## 2024-12-11 DIAGNOSIS — J44.9 CHRONIC OBSTRUCTIVE PULMONARY DISEASE, UNSPECIFIED COPD TYPE: ICD-10-CM

## 2024-12-11 RX ORDER — ALBUTEROL SULFATE 90 UG/1
2 INHALANT RESPIRATORY (INHALATION) EVERY 4 HOURS PRN
Qty: 18 G | Refills: 11 | Status: SHIPPED | OUTPATIENT
Start: 2024-12-11

## 2024-12-11 RX ORDER — ALBUTEROL SULFATE 90 UG/1
2 INHALANT RESPIRATORY (INHALATION) EVERY 4 HOURS PRN
Qty: 18 G | Refills: 11 | Status: SHIPPED | OUTPATIENT
Start: 2024-12-11 | End: 2024-12-11 | Stop reason: SDUPTHER

## 2025-01-15 ENCOUNTER — PATIENT OUTREACH (OUTPATIENT)
Facility: CLINIC | Age: 75
End: 2025-01-15
Payer: MEDICARE

## 2025-01-27 DIAGNOSIS — I25.10 CORONARY ARTERY DISEASE INVOLVING NATIVE CORONARY ARTERY OF NATIVE HEART, UNSPECIFIED WHETHER ANGINA PRESENT: ICD-10-CM

## 2025-01-27 DIAGNOSIS — I10 PRIMARY HYPERTENSION: ICD-10-CM

## 2025-01-27 RX ORDER — ISOSORBIDE MONONITRATE 120 MG/1
120 TABLET, EXTENDED RELEASE ORAL
Qty: 90 TABLET | Refills: 3 | OUTPATIENT
Start: 2025-01-27

## 2025-01-27 RX ORDER — METOPROLOL SUCCINATE 100 MG/1
100 TABLET, EXTENDED RELEASE ORAL
Qty: 90 TABLET | Refills: 3 | OUTPATIENT
Start: 2025-01-27

## 2025-01-27 RX ORDER — CLOPIDOGREL BISULFATE 75 MG/1
75 TABLET ORAL
Qty: 90 TABLET | Refills: 3 | OUTPATIENT
Start: 2025-01-27

## 2025-02-19 ENCOUNTER — TELEPHONE (OUTPATIENT)
Dept: FAMILY MEDICINE | Facility: CLINIC | Age: 75
End: 2025-02-19
Payer: MEDICARE

## 2025-02-19 DIAGNOSIS — J44.9 CHRONIC OBSTRUCTIVE PULMONARY DISEASE, UNSPECIFIED COPD TYPE: ICD-10-CM

## 2025-02-19 RX ORDER — FLUTICASONE FUROATE, UMECLIDINIUM BROMIDE AND VILANTEROL TRIFENATATE 100; 62.5; 25 UG/1; UG/1; UG/1
1 POWDER RESPIRATORY (INHALATION) DAILY
Qty: 60 EACH | Refills: 2 | Status: SHIPPED | OUTPATIENT
Start: 2025-02-19

## 2025-03-24 DIAGNOSIS — E78.49 OTHER HYPERLIPIDEMIA: ICD-10-CM

## 2025-03-24 DIAGNOSIS — I10 PRIMARY HYPERTENSION: ICD-10-CM

## 2025-03-24 RX ORDER — EZETIMIBE 10 MG/1
10 TABLET ORAL
Qty: 90 TABLET | Refills: 3 | Status: SHIPPED | OUTPATIENT
Start: 2025-03-24

## 2025-03-24 RX ORDER — BENAZEPRIL HYDROCHLORIDE 10 MG/1
10 TABLET ORAL
Qty: 90 TABLET | Refills: 3 | Status: SHIPPED | OUTPATIENT
Start: 2025-03-24

## 2025-05-05 ENCOUNTER — PATIENT MESSAGE (OUTPATIENT)
Facility: CLINIC | Age: 75
End: 2025-05-05
Payer: MEDICARE

## 2025-05-06 ENCOUNTER — TELEPHONE (OUTPATIENT)
Dept: FAMILY MEDICINE | Facility: CLINIC | Age: 75
End: 2025-05-06
Payer: MEDICARE

## 2025-05-06 NOTE — TELEPHONE ENCOUNTER
Caitie verbalized understanding that Mr. Mccann needs to schedule his CT. I gave her the number to Centralized scheduling.

## 2025-05-12 ENCOUNTER — TELEPHONE (OUTPATIENT)
Dept: FAMILY MEDICINE | Facility: CLINIC | Age: 75
End: 2025-05-12
Payer: MEDICARE

## 2025-05-12 DIAGNOSIS — I10 PRIMARY HYPERTENSION: ICD-10-CM

## 2025-05-12 RX ORDER — METOPROLOL SUCCINATE 100 MG/1
100 TABLET, EXTENDED RELEASE ORAL DAILY
Qty: 90 TABLET | Refills: 3 | Status: SHIPPED | OUTPATIENT
Start: 2025-05-12

## 2025-05-13 ENCOUNTER — TELEPHONE (OUTPATIENT)
Dept: FAMILY MEDICINE | Facility: CLINIC | Age: 75
End: 2025-05-13
Payer: MEDICARE

## 2025-05-13 NOTE — TELEPHONE ENCOUNTER
Called to let patient know that Gael is not the ordering provider on his ranexa his cardiologist usually orders this mediation no answer left vm

## 2025-05-28 ENCOUNTER — TELEPHONE (OUTPATIENT)
Dept: FAMILY MEDICINE | Facility: CLINIC | Age: 75
End: 2025-05-28
Payer: MEDICARE

## 2025-05-29 ENCOUNTER — TELEPHONE (OUTPATIENT)
Dept: FAMILY MEDICINE | Facility: CLINIC | Age: 75
End: 2025-05-29
Payer: MEDICARE

## 2025-05-29 DIAGNOSIS — J44.9 CHRONIC OBSTRUCTIVE PULMONARY DISEASE, UNSPECIFIED COPD TYPE: ICD-10-CM

## 2025-05-29 DIAGNOSIS — I25.10 CORONARY ARTERY DISEASE INVOLVING NATIVE CORONARY ARTERY OF NATIVE HEART, UNSPECIFIED WHETHER ANGINA PRESENT: ICD-10-CM

## 2025-05-29 RX ORDER — ALBUTEROL SULFATE 90 UG/1
2 INHALANT RESPIRATORY (INHALATION) EVERY 4 HOURS PRN
Qty: 18 G | Refills: 11 | OUTPATIENT
Start: 2025-05-29

## 2025-05-29 RX ORDER — FLUTICASONE FUROATE, UMECLIDINIUM BROMIDE AND VILANTEROL TRIFENATATE 100; 62.5; 25 UG/1; UG/1; UG/1
1 POWDER RESPIRATORY (INHALATION)
Qty: 60 EACH | Refills: 0 | Status: SHIPPED | OUTPATIENT
Start: 2025-05-29

## 2025-05-29 RX ORDER — CLOPIDOGREL BISULFATE 75 MG/1
75 TABLET ORAL DAILY
Qty: 90 TABLET | Refills: 0 | Status: SHIPPED | OUTPATIENT
Start: 2025-05-29

## 2025-05-30 ENCOUNTER — LAB VISIT (OUTPATIENT)
Dept: LAB | Facility: HOSPITAL | Age: 75
End: 2025-05-30
Attending: NURSE PRACTITIONER
Payer: MEDICARE

## 2025-05-30 DIAGNOSIS — Z12.5 ENCOUNTER FOR PROSTATE CANCER SCREENING: ICD-10-CM

## 2025-05-30 DIAGNOSIS — Z00.00 ENCOUNTER FOR MEDICARE ANNUAL WELLNESS EXAM: ICD-10-CM

## 2025-05-30 DIAGNOSIS — R73.03 PREDIABETES: ICD-10-CM

## 2025-05-30 DIAGNOSIS — I10 PRIMARY HYPERTENSION: ICD-10-CM

## 2025-05-30 DIAGNOSIS — E78.49 OTHER HYPERLIPIDEMIA: ICD-10-CM

## 2025-05-30 LAB
ALBUMIN SERPL-MCNC: 4.3 G/DL (ref 3.4–5)
ALBUMIN/GLOB SERPL: 1.6 RATIO
ALP SERPL-CCNC: 73 UNIT/L (ref 50–144)
ALT SERPL-CCNC: 18 UNIT/L (ref 1–45)
ANION GAP SERPL CALC-SCNC: 5 MEQ/L (ref 2–13)
AST SERPL-CCNC: 20 UNIT/L (ref 17–59)
BASOPHILS # BLD AUTO: 0.05 X10(3)/MCL (ref 0.01–0.08)
BASOPHILS NFR BLD AUTO: 0.8 % (ref 0.1–1.2)
BILIRUB SERPL-MCNC: 0.5 MG/DL (ref 0–1)
BUN SERPL-MCNC: 12 MG/DL (ref 7–20)
CALCIUM SERPL-MCNC: 9.2 MG/DL (ref 8.4–10.2)
CHLORIDE SERPL-SCNC: 109 MMOL/L (ref 98–110)
CHOLEST SERPL-MCNC: 138 MG/DL (ref 0–200)
CO2 SERPL-SCNC: 23 MMOL/L (ref 21–32)
CREAT SERPL-MCNC: 0.75 MG/DL (ref 0.66–1.25)
CREAT UR-MCNC: 102.4 MG/DL (ref 63–166)
CREAT/UREA NIT SERPL: 16 (ref 12–20)
EOSINOPHIL # BLD AUTO: 0.54 X10(3)/MCL (ref 0.04–0.54)
EOSINOPHIL NFR BLD AUTO: 8.5 % (ref 0.7–7)
ERYTHROCYTE [DISTWIDTH] IN BLOOD BY AUTOMATED COUNT: 15.4 %
EST. AVERAGE GLUCOSE BLD GHB EST-MCNC: 154.2 MG/DL (ref 70–115)
GFR SERPLBLD CREATININE-BSD FMLA CKD-EPI: >90 ML/MIN/1.73/M2
GLOBULIN SER-MCNC: 2.7 GM/DL (ref 2–3.9)
GLUCOSE SERPL-MCNC: 140 MG/DL (ref 70–115)
HBA1C MFR BLD: 7 % (ref 4–6)
HCT VFR BLD AUTO: 34.9 % (ref 36–52)
HDLC SERPL-MCNC: 68 MG/DL (ref 40–60)
HGB BLD-MCNC: 11.5 G/DL (ref 13–18)
IMM GRANULOCYTES # BLD AUTO: 0.03 X10(3)/MCL (ref 0–0.03)
IMM GRANULOCYTES NFR BLD AUTO: 0.5 % (ref 0–0.5)
LDLC SERPL DIRECT ASSAY-SCNC: 41.5 MG/DL (ref 30–100)
LYMPHOCYTES # BLD AUTO: 2.34 X10(3)/MCL (ref 1.32–3.57)
LYMPHOCYTES NFR BLD AUTO: 36.6 % (ref 20–55)
MCH RBC QN AUTO: 28.5 PG (ref 27–34)
MCHC RBC AUTO-ENTMCNC: 33 G/DL (ref 31–37)
MCV RBC AUTO: 86.6 FL (ref 79–99)
MICROALBUMIN UR-MCNC: 67.7 UG/ML
MICROALBUMIN/CREAT RATIO PNL UR: 66.1 MG/GM CR (ref 0–30)
MONOCYTES # BLD AUTO: 0.69 X10(3)/MCL (ref 0.3–0.82)
MONOCYTES NFR BLD AUTO: 10.8 % (ref 4.7–12.5)
NEUTROPHILS # BLD AUTO: 2.74 X10(3)/MCL (ref 1.78–5.38)
NEUTROPHILS NFR BLD AUTO: 42.8 % (ref 37–73)
NRBC BLD AUTO-RTO: 0 %
PLATELET # BLD AUTO: 361 X10(3)/MCL (ref 140–371)
PMV BLD AUTO: 9 FL (ref 9.4–12.4)
POTASSIUM SERPL-SCNC: 4.7 MMOL/L (ref 3.5–5.1)
PROT SERPL-MCNC: 7 GM/DL (ref 6.3–8.2)
PSA SERPL-MCNC: 0.8 NG/ML
RBC # BLD AUTO: 4.03 X10(6)/MCL (ref 4–6)
SODIUM SERPL-SCNC: 137 MMOL/L (ref 136–145)
TRIGL SERPL-MCNC: 115 MG/DL (ref 30–200)
TSH SERPL-ACNC: 1.94 UIU/ML (ref 0.36–3.74)
WBC # BLD AUTO: 6.39 X10(3)/MCL (ref 4–11.5)

## 2025-05-30 PROCEDURE — 85025 COMPLETE CBC W/AUTO DIFF WBC: CPT

## 2025-05-30 PROCEDURE — 36415 COLL VENOUS BLD VENIPUNCTURE: CPT

## 2025-05-30 PROCEDURE — 84153 ASSAY OF PSA TOTAL: CPT

## 2025-05-30 PROCEDURE — 83036 HEMOGLOBIN GLYCOSYLATED A1C: CPT

## 2025-05-30 PROCEDURE — 84443 ASSAY THYROID STIM HORMONE: CPT

## 2025-05-30 PROCEDURE — 82043 UR ALBUMIN QUANTITATIVE: CPT

## 2025-05-30 PROCEDURE — 80061 LIPID PANEL: CPT

## 2025-05-30 PROCEDURE — 80053 COMPREHEN METABOLIC PANEL: CPT

## 2025-06-02 ENCOUNTER — OFFICE VISIT (OUTPATIENT)
Dept: FAMILY MEDICINE | Facility: CLINIC | Age: 75
End: 2025-06-02
Payer: MEDICARE

## 2025-06-02 VITALS
DIASTOLIC BLOOD PRESSURE: 64 MMHG | HEART RATE: 85 BPM | SYSTOLIC BLOOD PRESSURE: 108 MMHG | BODY MASS INDEX: 30.11 KG/M2 | WEIGHT: 176.38 LBS | HEIGHT: 64 IN | OXYGEN SATURATION: 94 % | TEMPERATURE: 98 F

## 2025-06-02 DIAGNOSIS — Z00.00 ENCOUNTER FOR MEDICARE ANNUAL WELLNESS EXAM: Primary | ICD-10-CM

## 2025-06-02 DIAGNOSIS — J42 CHRONIC BRONCHITIS, UNSPECIFIED CHRONIC BRONCHITIS TYPE: ICD-10-CM

## 2025-06-02 DIAGNOSIS — I77.9 PERIPHERAL ARTERIAL OCCLUSIVE DISEASE: ICD-10-CM

## 2025-06-02 DIAGNOSIS — R80.9 CONTROLLED TYPE 2 DIABETES MELLITUS WITH MICROALBUMINURIA, WITHOUT LONG-TERM CURRENT USE OF INSULIN: ICD-10-CM

## 2025-06-02 DIAGNOSIS — E11.9 CONTROLLED TYPE 2 DIABETES MELLITUS WITHOUT COMPLICATION, WITHOUT LONG-TERM CURRENT USE OF INSULIN: ICD-10-CM

## 2025-06-02 DIAGNOSIS — E66.811 CLASS 1 OBESITY DUE TO EXCESS CALORIES WITH SERIOUS COMORBIDITY AND BODY MASS INDEX (BMI) OF 30.0 TO 30.9 IN ADULT: ICD-10-CM

## 2025-06-02 DIAGNOSIS — Z12.2 ENCOUNTER FOR SCREENING FOR LUNG CANCER: ICD-10-CM

## 2025-06-02 DIAGNOSIS — Z86.0100 HISTORY OF COLON POLYPS: ICD-10-CM

## 2025-06-02 DIAGNOSIS — Z87.891 PERSONAL HISTORY OF NICOTINE DEPENDENCE: ICD-10-CM

## 2025-06-02 DIAGNOSIS — Z23 NEED FOR PNEUMOCOCCAL VACCINATION: ICD-10-CM

## 2025-06-02 DIAGNOSIS — Z12.2 SCREENING FOR LUNG CANCER: ICD-10-CM

## 2025-06-02 DIAGNOSIS — F17.200 TOBACCO DEPENDENCE: ICD-10-CM

## 2025-06-02 DIAGNOSIS — Z12.5 ENCOUNTER FOR SCREENING FOR MALIGNANT NEOPLASM OF PROSTATE: ICD-10-CM

## 2025-06-02 DIAGNOSIS — Z72.0 TOBACCO ABUSE: ICD-10-CM

## 2025-06-02 DIAGNOSIS — Z72.3 LACK OF PHYSICAL ACTIVITY: ICD-10-CM

## 2025-06-02 DIAGNOSIS — I10 PRIMARY HYPERTENSION: ICD-10-CM

## 2025-06-02 DIAGNOSIS — Z23 IMMUNIZATION DUE: ICD-10-CM

## 2025-06-02 DIAGNOSIS — E78.49 OTHER HYPERLIPIDEMIA: ICD-10-CM

## 2025-06-02 DIAGNOSIS — E66.09 CLASS 1 OBESITY DUE TO EXCESS CALORIES WITH SERIOUS COMORBIDITY AND BODY MASS INDEX (BMI) OF 30.0 TO 30.9 IN ADULT: ICD-10-CM

## 2025-06-02 DIAGNOSIS — I25.118 CORONARY ARTERY DISEASE OF NATIVE ARTERY OF NATIVE HEART WITH STABLE ANGINA PECTORIS: ICD-10-CM

## 2025-06-02 DIAGNOSIS — E11.29 CONTROLLED TYPE 2 DIABETES MELLITUS WITH MICROALBUMINURIA, WITHOUT LONG-TERM CURRENT USE OF INSULIN: ICD-10-CM

## 2025-06-02 PROCEDURE — 1158F ADVNC CARE PLAN TLK DOCD: CPT | Mod: ,,, | Performed by: NURSE PRACTITIONER

## 2025-06-02 PROCEDURE — G0009 ADMIN PNEUMOCOCCAL VACCINE: HCPCS | Mod: ,,, | Performed by: NURSE PRACTITIONER

## 2025-06-02 PROCEDURE — 3078F DIAST BP <80 MM HG: CPT | Mod: ,,, | Performed by: NURSE PRACTITIONER

## 2025-06-02 PROCEDURE — 90677 PCV20 VACCINE IM: CPT | Mod: ,,, | Performed by: NURSE PRACTITIONER

## 2025-06-02 PROCEDURE — 1159F MED LIST DOCD IN RCRD: CPT | Mod: ,,, | Performed by: NURSE PRACTITIONER

## 2025-06-02 PROCEDURE — 1160F RVW MEDS BY RX/DR IN RCRD: CPT | Mod: ,,, | Performed by: NURSE PRACTITIONER

## 2025-06-02 PROCEDURE — G0439 PPPS, SUBSEQ VISIT: HCPCS | Mod: ,,, | Performed by: NURSE PRACTITIONER

## 2025-06-02 PROCEDURE — 3074F SYST BP LT 130 MM HG: CPT | Mod: ,,, | Performed by: NURSE PRACTITIONER

## 2025-06-02 RX ORDER — CLOPIDOGREL BISULFATE 75 MG/1
75 TABLET ORAL DAILY
Qty: 90 TABLET | Refills: 3 | Status: SHIPPED | OUTPATIENT
Start: 2025-06-02

## 2025-06-02 RX ORDER — ISOSORBIDE MONONITRATE 120 MG/1
120 TABLET, EXTENDED RELEASE ORAL DAILY
Qty: 90 TABLET | Refills: 3 | Status: SHIPPED | OUTPATIENT
Start: 2025-06-02

## 2025-06-02 RX ORDER — EZETIMIBE 10 MG/1
10 TABLET ORAL DAILY
Qty: 90 TABLET | Refills: 3 | Status: SHIPPED | OUTPATIENT
Start: 2025-06-02

## 2025-06-02 RX ORDER — ROSUVASTATIN CALCIUM 40 MG/1
TABLET, COATED ORAL
Qty: 90 TABLET | Refills: 3 | Status: SHIPPED | OUTPATIENT
Start: 2025-06-02

## 2025-06-02 RX ORDER — BENAZEPRIL HYDROCHLORIDE 10 MG/1
10 TABLET ORAL DAILY
Qty: 90 TABLET | Refills: 3 | Status: SHIPPED | OUTPATIENT
Start: 2025-06-02

## 2025-06-04 ENCOUNTER — PATIENT MESSAGE (OUTPATIENT)
Dept: FAMILY MEDICINE | Facility: CLINIC | Age: 75
End: 2025-06-04
Payer: MEDICARE

## 2025-07-22 ENCOUNTER — TELEPHONE (OUTPATIENT)
Dept: FAMILY MEDICINE | Facility: CLINIC | Age: 75
End: 2025-07-22
Payer: MEDICARE

## 2025-07-23 ENCOUNTER — TELEPHONE (OUTPATIENT)
Dept: FAMILY MEDICINE | Facility: CLINIC | Age: 75
End: 2025-07-23
Payer: MEDICARE

## 2025-07-23 DIAGNOSIS — J44.9 CHRONIC OBSTRUCTIVE PULMONARY DISEASE, UNSPECIFIED COPD TYPE: ICD-10-CM

## 2025-07-23 RX ORDER — ALBUTEROL SULFATE 90 UG/1
2 INHALANT RESPIRATORY (INHALATION) EVERY 4 HOURS PRN
Qty: 18 G | Refills: 11 | Status: SHIPPED | OUTPATIENT
Start: 2025-07-23

## 2025-07-23 RX ORDER — ALBUTEROL SULFATE 90 UG/1
2 INHALANT RESPIRATORY (INHALATION) EVERY 4 HOURS PRN
Qty: 18 G | Refills: 11 | Status: SHIPPED | OUTPATIENT
Start: 2025-07-23 | End: 2025-07-23

## 2025-07-23 NOTE — TELEPHONE ENCOUNTER
I called and spoke with the pharmacist she explained that the Mr. Mccann is getting 3 inhalers at one time for a 90 day supply so he is out of refills. I called and educated Mr. Mccann that his inhaler is PRN he verbalized understanding and requested all his refills to be sent to [facility] out patient pharmacy.

## 2025-08-14 ENCOUNTER — TELEPHONE (OUTPATIENT)
Dept: FAMILY MEDICINE | Facility: CLINIC | Age: 75
End: 2025-08-14
Payer: MEDICARE

## 2025-08-14 DIAGNOSIS — J44.9 CHRONIC OBSTRUCTIVE PULMONARY DISEASE, UNSPECIFIED COPD TYPE: Primary | ICD-10-CM

## 2025-08-14 RX ORDER — BUDESONIDE, GLYCOPYRROLATE, AND FORMOTEROL FUMARATE 160; 9; 4.8 UG/1; UG/1; UG/1
2 AEROSOL, METERED RESPIRATORY (INHALATION) 2 TIMES DAILY
Qty: 10.7 G | Refills: 11 | Status: SHIPPED | OUTPATIENT
Start: 2025-08-14

## 2025-08-19 ENCOUNTER — OFFICE VISIT (OUTPATIENT)
Dept: FAMILY MEDICINE | Facility: CLINIC | Age: 75
End: 2025-08-19

## 2025-08-19 VITALS
OXYGEN SATURATION: 95 % | SYSTOLIC BLOOD PRESSURE: 118 MMHG | DIASTOLIC BLOOD PRESSURE: 80 MMHG | HEART RATE: 82 BPM | HEIGHT: 64 IN | WEIGHT: 173 LBS | BODY MASS INDEX: 29.53 KG/M2 | TEMPERATURE: 98 F

## 2025-08-19 DIAGNOSIS — T14.8XXA SKIN ABRASION: ICD-10-CM

## 2025-08-19 DIAGNOSIS — I10 PRIMARY HYPERTENSION: ICD-10-CM

## 2025-08-19 DIAGNOSIS — J42 CHRONIC BRONCHITIS, UNSPECIFIED CHRONIC BRONCHITIS TYPE: Primary | ICD-10-CM

## 2025-08-19 PROCEDURE — 99213 OFFICE O/P EST LOW 20 MIN: CPT | Mod: ,,, | Performed by: NURSE PRACTITIONER

## 2025-08-19 RX ORDER — MUPIROCIN 20 MG/G
OINTMENT TOPICAL 3 TIMES DAILY
Qty: 22 G | Refills: 0 | Status: SHIPPED | OUTPATIENT
Start: 2025-08-19

## (undated) DEVICE — Device

## (undated) DEVICE — KIT VALVE HEMSTAS ACCESS-9

## (undated) DEVICE — GUIDEWIRE INQWIRE SE 3MM JTIP

## (undated) DEVICE — CATH PIGTAIL MOD 5.2FR 125CM

## (undated) DEVICE — CATH NC EMERGE MR 3X20MM

## (undated) DEVICE — PAD DEFIB RADIOTRANSPARENT

## (undated) DEVICE — GUIDE LAUNCHER 6FR JR 4.0

## (undated) DEVICE — KIT MANIFOLD LOW PRESS TUBING

## (undated) DEVICE — CATH NC EMERGE MR 3.5X30MM

## (undated) DEVICE — SLEEVE SHOCKWAVE CABLE 5X96IN

## (undated) DEVICE — CATH OPTITORQUE TIGER 5F 100CM

## (undated) DEVICE — DEVICE BASIXCOMPAK INFL 20ML

## (undated) DEVICE — GUIDEWIRE OMNI STR TIP 185CM

## (undated) DEVICE — CATH NC EMERGE MR 4X12MM

## (undated) DEVICE — GUIDE LAUNCHER 6FR EBU 3.0

## (undated) DEVICE — CATH NC EMERGE MR 5.0X12MM

## (undated) DEVICE — CATH NC EMERGE MR 3.5X20MM

## (undated) DEVICE — CATH SHOCKWAVE C2 IVL 3.5X12MM

## (undated) DEVICE — GUIDEWIRE PROWATER .014X180CM

## (undated) DEVICE — CATH EXPO PIG ANGIO

## (undated) DEVICE — SEE MEDLINE ITEM 157187

## (undated) DEVICE — CATH EAGLE EYE ST .014X20X150

## (undated) DEVICE — GUIDEWIRE RUNTHROUGH EF 180CM

## (undated) DEVICE — TUBE CONNECTING 10IN

## (undated) DEVICE — CATH OPTITORQUE TIG 4.0 100 CM

## (undated) DEVICE — ANGIOTOUCH KIT

## (undated) DEVICE — SHEATH GLIDE SLENDER 6FR

## (undated) DEVICE — HEMOSTAT VASC BAND REG 24CM